# Patient Record
Sex: FEMALE | Race: WHITE | NOT HISPANIC OR LATINO | URBAN - METROPOLITAN AREA
[De-identification: names, ages, dates, MRNs, and addresses within clinical notes are randomized per-mention and may not be internally consistent; named-entity substitution may affect disease eponyms.]

---

## 2022-01-13 ENCOUNTER — HOSPITAL ENCOUNTER (EMERGENCY)
Facility: HOSPITAL | Age: 57
Discharge: HOME | End: 2022-01-13
Attending: EMERGENCY MEDICINE
Payer: COMMERCIAL

## 2022-01-13 VITALS
BODY MASS INDEX: 28.68 KG/M2 | WEIGHT: 168 LBS | RESPIRATION RATE: 20 BRPM | HEIGHT: 64 IN | HEART RATE: 92 BPM | TEMPERATURE: 98 F | SYSTOLIC BLOOD PRESSURE: 159 MMHG | DIASTOLIC BLOOD PRESSURE: 94 MMHG | OXYGEN SATURATION: 95 %

## 2022-01-13 DIAGNOSIS — I10 HYPERTENSION: ICD-10-CM

## 2022-01-13 DIAGNOSIS — G44.209 ACUTE NON INTRACTABLE TENSION-TYPE HEADACHE: Primary | ICD-10-CM

## 2022-01-13 LAB
ALBUMIN SERPL BCP-MCNC: 4.1 G/DL (ref 3.5–5.2)
ALP SERPL-CCNC: 76 U/L (ref 55–135)
ALT SERPL W/O P-5'-P-CCNC: 86 U/L (ref 10–44)
ANION GAP SERPL CALC-SCNC: 12 MMOL/L (ref 8–16)
AST SERPL-CCNC: 39 U/L (ref 10–40)
BASOPHILS # BLD AUTO: 0.04 K/UL (ref 0–0.2)
BASOPHILS NFR BLD: 0.6 % (ref 0–1.9)
BILIRUB SERPL-MCNC: 0.4 MG/DL (ref 0.1–1)
BILIRUB UR QL STRIP: NEGATIVE
BNP SERPL-MCNC: 10 PG/ML (ref 0–99)
BUN SERPL-MCNC: 12 MG/DL (ref 6–20)
CALCIUM SERPL-MCNC: 9.8 MG/DL (ref 8.7–10.5)
CHLORIDE SERPL-SCNC: 108 MMOL/L (ref 95–110)
CLARITY UR: CLEAR
CO2 SERPL-SCNC: 21 MMOL/L (ref 23–29)
COLOR UR: YELLOW
CREAT SERPL-MCNC: 0.8 MG/DL (ref 0.5–1.4)
D DIMER PPP IA.FEU-MCNC: 0.26 MG/L FEU
DIFFERENTIAL METHOD: ABNORMAL
EOSINOPHIL # BLD AUTO: 0.2 K/UL (ref 0–0.5)
EOSINOPHIL NFR BLD: 2.8 % (ref 0–8)
ERYTHROCYTE [DISTWIDTH] IN BLOOD BY AUTOMATED COUNT: 12.6 % (ref 11.5–14.5)
EST. GFR  (AFRICAN AMERICAN): >60 ML/MIN/1.73 M^2
EST. GFR  (NON AFRICAN AMERICAN): >60 ML/MIN/1.73 M^2
GLUCOSE SERPL-MCNC: 115 MG/DL (ref 70–110)
GLUCOSE UR QL STRIP: NEGATIVE
HCT VFR BLD AUTO: 44.8 % (ref 37–48.5)
HGB BLD-MCNC: 15.3 G/DL (ref 12–16)
HGB UR QL STRIP: NEGATIVE
IMM GRANULOCYTES # BLD AUTO: 0.02 K/UL (ref 0–0.04)
IMM GRANULOCYTES NFR BLD AUTO: 0.3 % (ref 0–0.5)
KETONES UR QL STRIP: NEGATIVE
LEUKOCYTE ESTERASE UR QL STRIP: NEGATIVE
LYMPHOCYTES # BLD AUTO: 1.7 K/UL (ref 1–4.8)
LYMPHOCYTES NFR BLD: 24.5 % (ref 18–48)
MCH RBC QN AUTO: 30.3 PG (ref 27–31)
MCHC RBC AUTO-ENTMCNC: 34.2 G/DL (ref 32–36)
MCV RBC AUTO: 89 FL (ref 82–98)
MONOCYTES # BLD AUTO: 0.8 K/UL (ref 0.3–1)
MONOCYTES NFR BLD: 11.3 % (ref 4–15)
NEUTROPHILS # BLD AUTO: 4.3 K/UL (ref 1.8–7.7)
NEUTROPHILS NFR BLD: 60.5 % (ref 38–73)
NITRITE UR QL STRIP: NEGATIVE
NRBC BLD-RTO: 0 /100 WBC
PH UR STRIP: 6 [PH] (ref 5–8)
PLATELET # BLD AUTO: 293 K/UL (ref 150–450)
PMV BLD AUTO: 9.1 FL (ref 9.2–12.9)
POTASSIUM SERPL-SCNC: 3.5 MMOL/L (ref 3.5–5.1)
PROT SERPL-MCNC: 7.6 G/DL (ref 6–8.4)
PROT UR QL STRIP: NEGATIVE
RBC # BLD AUTO: 5.05 M/UL (ref 4–5.4)
SARS-COV-2 RDRP RESP QL NAA+PROBE: NEGATIVE
SODIUM SERPL-SCNC: 141 MMOL/L (ref 136–145)
SP GR UR STRIP: 1.02 (ref 1–1.03)
TROPONIN I SERPL DL<=0.01 NG/ML-MCNC: <0.006 NG/ML (ref 0–0.03)
TSH SERPL DL<=0.005 MIU/L-ACNC: 1.54 UIU/ML (ref 0.4–4)
URN SPEC COLLECT METH UR: NORMAL
UROBILINOGEN UR STRIP-ACNC: NEGATIVE EU/DL
WBC # BLD AUTO: 7.11 K/UL (ref 3.9–12.7)

## 2022-01-13 PROCEDURE — 93010 EKG 12-LEAD: ICD-10-PCS | Mod: ,,, | Performed by: INTERNAL MEDICINE

## 2022-01-13 PROCEDURE — 81003 URINALYSIS AUTO W/O SCOPE: CPT | Performed by: EMERGENCY MEDICINE

## 2022-01-13 PROCEDURE — 84443 ASSAY THYROID STIM HORMONE: CPT | Performed by: EMERGENCY MEDICINE

## 2022-01-13 PROCEDURE — 25000003 PHARM REV CODE 250: Performed by: EMERGENCY MEDICINE

## 2022-01-13 PROCEDURE — 85025 COMPLETE CBC W/AUTO DIFF WBC: CPT | Performed by: EMERGENCY MEDICINE

## 2022-01-13 PROCEDURE — 96374 THER/PROPH/DIAG INJ IV PUSH: CPT

## 2022-01-13 PROCEDURE — U0002 COVID-19 LAB TEST NON-CDC: HCPCS | Performed by: EMERGENCY MEDICINE

## 2022-01-13 PROCEDURE — 99285 EMERGENCY DEPT VISIT HI MDM: CPT | Mod: 25

## 2022-01-13 PROCEDURE — 36415 COLL VENOUS BLD VENIPUNCTURE: CPT | Performed by: EMERGENCY MEDICINE

## 2022-01-13 PROCEDURE — 93010 ELECTROCARDIOGRAM REPORT: CPT | Mod: ,,, | Performed by: INTERNAL MEDICINE

## 2022-01-13 PROCEDURE — 85379 FIBRIN DEGRADATION QUANT: CPT | Performed by: EMERGENCY MEDICINE

## 2022-01-13 PROCEDURE — 93005 ELECTROCARDIOGRAM TRACING: CPT

## 2022-01-13 PROCEDURE — 83880 ASSAY OF NATRIURETIC PEPTIDE: CPT | Performed by: EMERGENCY MEDICINE

## 2022-01-13 PROCEDURE — 63600175 PHARM REV CODE 636 W HCPCS: Performed by: EMERGENCY MEDICINE

## 2022-01-13 PROCEDURE — 80053 COMPREHEN METABOLIC PANEL: CPT | Performed by: EMERGENCY MEDICINE

## 2022-01-13 PROCEDURE — 84484 ASSAY OF TROPONIN QUANT: CPT | Performed by: EMERGENCY MEDICINE

## 2022-01-13 RX ORDER — KETOROLAC TROMETHAMINE 30 MG/ML
10 INJECTION, SOLUTION INTRAMUSCULAR; INTRAVENOUS
Status: COMPLETED | OUTPATIENT
Start: 2022-01-13 | End: 2022-01-13

## 2022-01-13 RX ORDER — CLONIDINE HYDROCHLORIDE 0.1 MG/1
0.1 TABLET ORAL EVERY 6 HOURS PRN
Qty: 12 TABLET | Refills: 0 | Status: SHIPPED | OUTPATIENT
Start: 2022-01-13 | End: 2023-01-13

## 2022-01-13 RX ORDER — CLONIDINE HYDROCHLORIDE 0.1 MG/1
0.1 TABLET ORAL
Status: COMPLETED | OUTPATIENT
Start: 2022-01-13 | End: 2022-01-13

## 2022-01-13 RX ORDER — KETOROLAC TROMETHAMINE 10 MG/1
10 TABLET, FILM COATED ORAL EVERY 8 HOURS PRN
Qty: 6 TABLET | Refills: 0 | Status: SHIPPED | OUTPATIENT
Start: 2022-01-13 | End: 2022-01-15

## 2022-01-13 RX ORDER — ESOMEPRAZOLE MAGNESIUM 20 MG/1
20 GRANULE, DELAYED RELEASE ORAL
COMMUNITY

## 2022-01-13 RX ORDER — KETOROLAC TROMETHAMINE 30 MG/ML
INJECTION, SOLUTION INTRAMUSCULAR; INTRAVENOUS
Status: COMPLETED
Start: 2022-01-13 | End: 2022-01-13

## 2022-01-13 RX ADMIN — KETOROLAC TROMETHAMINE 10 MG: 30 INJECTION, SOLUTION INTRAMUSCULAR; INTRAVENOUS at 03:01

## 2022-01-13 RX ADMIN — CLONIDINE HYDROCHLORIDE 0.1 MG: 0.1 TABLET ORAL at 02:01

## 2022-01-13 NOTE — ED PROVIDER NOTES
"Encounter Date: 1/13/2022    SCRIBE #1 NOTE: I, Jerichobenito Ramirez, am scribing for, and in the presence of, Sanjeev Cruz MD.       History     Chief Complaint   Patient presents with    Headache / Hypertension     States she has had elevated blood pressure and a headache since Tuesday that is not responsive to tylenol.       Time seen by provider: 1:54 PM on 01/13/2022    Claire Guardado is a 56 y.o. female who presents to the ED with an onset of a HA and high blood pressure for the past 4 days. Patient's brother states patient had a blood pressure reading of 240/140 at home today. Patient reportedly has a family hx of strokes. She notes feeling "weird" and states her skin feels "tingly." Patient mentions receiving the Covid-19 booster 4 days ago. Patient states she has no hx of HTN or thyroid problems. She denies taking any daily medication besides Nexium. The patient denies any CP, abdominal pain, or any other symptoms at this time. PMHx of GERD. No pertinent PSHx. No record of patient being a smoker.       The history is provided by the patient and a relative (brother).     Review of patient's allergies indicates:   Allergen Reactions    Augmentin [amoxicillin-pot clavulanate]      Past Medical History:   Diagnosis Date    GERD (gastroesophageal reflux disease)      Past Surgical History:   Procedure Laterality Date    HYSTERECTOMY       Family History   Problem Relation Age of Onset    Hypertension Mother     Stroke Mother     Arthritis Mother     Hypertension Brother      Social History     Substance Use Topics    Alcohol use: Never    Drug use: Never     Review of Systems   Constitutional: Negative for appetite change, chills and fever.   HENT: Negative for congestion and nosebleeds.    Eyes: Negative for visual disturbance.   Respiratory: Negative for cough and shortness of breath.    Cardiovascular: Negative for chest pain and palpitations.   Gastrointestinal: Negative for abdominal pain, " diarrhea, nausea and vomiting.   Genitourinary: Negative for dysuria and hematuria.   Musculoskeletal: Negative for back pain and neck pain.   Skin: Negative for rash.   Neurological: Positive for weakness, numbness and headaches. Negative for seizures and syncope.       Physical Exam     Initial Vitals [01/13/22 1332]   BP Pulse Resp Temp SpO2   (!) 185/97 (!) 117 20 97.7 °F (36.5 °C) 99 %      MAP       --         Physical Exam    Nursing note and vitals reviewed.  Constitutional: She appears well-nourished.   Patient is flushed.    HENT:   Head: Normocephalic and atraumatic.   Eyes: Conjunctivae and EOM are normal.   Neck: Neck supple. No thyroid mass present.   Normal range of motion.  Cardiovascular: Regular rhythm and normal heart sounds. Tachycardia present.  Exam reveals no gallop and no friction rub.    No murmur heard.  Pulmonary/Chest: Breath sounds normal. She has no wheezes. She has no rhonchi. She has no rales.   Musculoskeletal:      Cervical back: Normal range of motion and neck supple.     Neurological: She is alert and oriented to person, place, and time. She has normal strength. No cranial nerve deficit or sensory deficit.   Skin: Skin is warm and dry. No rash noted. No erythema.   Psychiatric: She has a normal mood and affect. Her speech is normal. Cognition and memory are normal.         ED Course   Procedures  Labs Reviewed   CBC W/ AUTO DIFFERENTIAL - Abnormal; Notable for the following components:       Result Value    MPV 9.1 (*)     All other components within normal limits   COMPREHENSIVE METABOLIC PANEL - Abnormal; Notable for the following components:    CO2 21 (*)     Glucose 115 (*)     ALT 86 (*)     All other components within normal limits   B-TYPE NATRIURETIC PEPTIDE   D DIMER, QUANTITATIVE   TROPONIN I   TSH   URINALYSIS, REFLEX TO URINE CULTURE    Narrative:     Specimen Source->Urine   SARS-COV-2 RNA AMPLIFICATION, QUAL     EKG Readings: (Independently Interpreted)   Initial  Reading: No STEMI. Rhythm: Sinus Tachycardia. Heart Rate: 127. Axis: Normal.   Normal intervals. Possible inferior infarct, age undetermined.      ECG Results          EKG 12-lead (In process)  Result time 01/13/22 15:01:22    In process by Interface, Lab In [facility] (01/13/22 15:01:22)                 Narrative:    Test Reason : I10,    Vent. Rate : 127 BPM     Atrial Rate : 127 BPM     P-R Int : 142 ms          QRS Dur : 078 ms      QT Int : 316 ms       P-R-T Axes : 057 -13 040 degrees     QTc Int : 459 ms    Sinus tachycardia  Possible Anterior infarct ,age undetermined  Abnormal ECG  No previous ECGs available    Referred By:  ED MD           Confirmed By:                             Imaging Results          X-Ray Chest AP Portable (Final result)  Result time 01/13/22 16:03:27    Final result by Carola Brumfield MD (01/13/22 16:03:27)                 Impression:      No acute abnormality.      Electronically signed by: Carola Brumfield MD  Date:    01/13/2022  Time:    16:03             Narrative:    EXAMINATION:  XR CHEST AP PORTABLE    CLINICAL HISTORY:  Essential (primary) hypertension    TECHNIQUE:  Single frontal view of the chest was performed.    COMPARISON:  None    FINDINGS:  The lungs are clear, with normal appearance of pulmonary vasculature and no pleural effusion or pneumothorax.    The cardiac silhouette is normal in size. The hilar and mediastinal contours are unremarkable.    Bones are intact.                               CT Head Without Contrast (Final result)  Result time 01/13/22 14:35:03    Final result by Pj Abdullahi MD (01/13/22 14:35:03)                 Impression:      1.  There is no acute intracranial abnormality.  There is no hemorrhage, mass/mass effect, acute edema or ischemia.    2.  Left maxillary sinusitis.      Electronically signed by: Pj Abdullahi MD  Date:    01/13/2022  Time:    14:35             Narrative:    EXAMINATION:  CT HEAD WITHOUT CONTRAST    CLINICAL  HISTORY:  Headache, new or worsening (Age >= 50y);    TECHNIQUE:  Routine unenhanced axial images were obtained through the head.  Sagittal and coronal reformatted images were created.  The study is reviewed in bone and soft tissue windows.    COMPARISON:  None    FINDINGS:  Intracranial contents: There is no acute intracranial abnormality.  Brain volume, ventricular size and position are normal.  There is no hemorrhage or mass/mass effect.  There is no acute edema or ischemia.  The gray-white interface is preserved without obvious acute infarction.  There are no definite regions of abnormal attenuation in the brain.  The vessels are all slightly dense which can be seen in the setting of dehydration, hemoconcentration.  There is no abnormal extra-axial fluid collection.  The basilar cisterns are open.  The cerebellar tonsils are in normal position at the level of the foramen magnum.    Extracranial contents, calvarium, soft tissues: The calvarium is normal.  There is mucosal thickening with fluid in the left maxillary sinus.  There is trace mucosal thickening in the posterior ethmoid air cells.  Otherwise, the included paranasal sinuses and mastoid air cells are clear.                                 Medications   cloNIDine tablet 0.1 mg (0.1 mg Oral Given 1/13/22 1415)   ketorolac injection 9.999 mg (9.999 mg Intravenous Given 1/13/22 1543)   ketorolac (TORADOL) 30 mg/mL (1 mL) injection (  Return to Cabinet 1/13/22 1545)     Medical Decision Making:   History:   Old Medical Records: I decided to obtain old medical records.  Clinical Tests:   Lab Tests: Reviewed and Ordered  Radiological Study: Ordered and Reviewed  Medical Tests: Ordered and Reviewed  ED Management:  This patient was emergently assessed shortly after arrival.  Initial vital signs are significant for hypertension but the patient is alert and neuro intact.  There is no meningismus.  She is nontoxic in appearance.  She is provided clonidine gradual  marked improvement in blood pressure.  She later was provided Toradol which induce further resolution of her headache.  Large workup including EKG, CBC, CMP, thyroid studies, urinalysis, BNP, D-dimer, chest x-ray, troponin were all found to be within normal limits for the patient.  She is COVID negative.  On final reassessment she is educated the cause of her acute onset hypertension and headache are not overly known but may be secondary to a side effect of the recent immunization.  She is educated that I have very low suspicion for occult additional contributors including pheochromocytoma, CNS infection, additional metabolic crisis.  She will be discharged with a prescription for clonidine to take should her blood pressure /100, as well as a short course of Toradol for additional breakthrough pain but she is asked to follow up with her primary care doctor as soon as possible regarding further blood pressure monitoring and treatment.  She was asked to return to the emergency department immediately for any new, concerning, or worsening symptoms including return of headache or any neurologic symptoms.  Patient was agreeable with this plan for follow-up and was discharged stable condition with a .          Scribe Attestation:   Scribe #1: I performed the above scribed service and the documentation accurately describes the services I performed. I attest to the accuracy of the note.              I, Dr. Sanjeev Cruz, personally performed the services described in this documentation. All medical record entries made by the scribe were at my direction and in my presence.  I have reviewed the chart and agree that the record reflects my personal performance and is accurate and complete. Sanjeev Cruz MD.  8:15 AM 01/14/2022      Clinical Impression:   Final diagnoses:  [I10] Hypertension  [G44.209] Acute non intractable tension-type headache (Primary)          ED Disposition Condition    Discharge Stable        ED  Prescriptions     Medication Sig Dispense Start Date End Date Auth. Provider    cloNIDine (CATAPRES) 0.1 MG tablet Take 1 tablet (0.1 mg total) by mouth every 6 (six) hours as needed (BP > 160/100 mm Hg). 12 tablet 1/13/2022 1/13/2023 Sanjeev Cruz MD    ketorolac (TORADOL) 10 mg tablet Take 1 tablet (10 mg total) by mouth every 8 (eight) hours as needed for Pain. 6 tablet 1/13/2022 1/15/2022 Sanjeev Cruz MD        Follow-up Information     Follow up With Specialties Details Why Contact Info    [facility] Emergency Dept Emergency Medicine  If symptoms worsen [address] [place] [id number] [phone]           Sanjeev Cruz MD  01/14/22 0833

## 2024-09-10 ENCOUNTER — TELEPHONE (OUTPATIENT)
Facility: CLINIC | Age: 59
End: 2024-09-10
Payer: COMMERCIAL

## 2024-09-10 DIAGNOSIS — R89.7 ABNORMAL BREAST BIOPSY: Primary | ICD-10-CM

## 2024-09-10 NOTE — TELEPHONE ENCOUNTER
Per Dr. Kapoor, I reached out to pt to schedule an appt with Comprehensive breast clinic. Pt had breast biopsy at [facility] and it returned positive. Pt scheduled for 9/16 at 3:00. Pt verbalized understanding.

## 2024-09-16 ENCOUNTER — OFFICE VISIT (OUTPATIENT)
Facility: CLINIC | Age: 59
End: 2024-09-16
Payer: COMMERCIAL

## 2024-09-16 VITALS
HEART RATE: 111 BPM | TEMPERATURE: 99 F | SYSTOLIC BLOOD PRESSURE: 137 MMHG | TEMPERATURE: 99 F | HEART RATE: 111 BPM | BODY MASS INDEX: 28.49 KG/M2 | DIASTOLIC BLOOD PRESSURE: 80 MMHG | SYSTOLIC BLOOD PRESSURE: 137 MMHG | BODY MASS INDEX: 28.49 KG/M2 | WEIGHT: 166 LBS | WEIGHT: 166 LBS | DIASTOLIC BLOOD PRESSURE: 80 MMHG

## 2024-09-16 DIAGNOSIS — R89.7 ABNORMAL BREAST BIOPSY: ICD-10-CM

## 2024-09-16 DIAGNOSIS — C50.911 INVASIVE DUCTAL CARCINOMA OF BREAST, FEMALE, RIGHT: Primary | ICD-10-CM

## 2024-09-16 PROCEDURE — 3075F SYST BP GE 130 - 139MM HG: CPT | Mod: CPTII,S$GLB,, | Performed by: RADIOLOGY

## 2024-09-16 PROCEDURE — 1159F MED LIST DOCD IN RCRD: CPT | Mod: CPTII,S$GLB,, | Performed by: RADIOLOGY

## 2024-09-16 PROCEDURE — G2211 COMPLEX E/M VISIT ADD ON: HCPCS | Mod: S$GLB,,, | Performed by: RADIOLOGY

## 2024-09-16 PROCEDURE — 4010F ACE/ARB THERAPY RXD/TAKEN: CPT | Mod: CPTII,S$GLB,, | Performed by: RADIOLOGY

## 2024-09-16 PROCEDURE — 99999 PR PBB SHADOW E&M-EST. PATIENT-LVL III: CPT | Mod: PBBFAC,,, | Performed by: INTERNAL MEDICINE

## 2024-09-16 PROCEDURE — 1160F RVW MEDS BY RX/DR IN RCRD: CPT | Mod: CPTII,S$GLB,, | Performed by: RADIOLOGY

## 2024-09-16 PROCEDURE — 99999 PR PBB SHADOW E&M-EST. PATIENT-LVL III: CPT | Mod: PBBFAC,,,

## 2024-09-16 PROCEDURE — 3008F BODY MASS INDEX DOCD: CPT | Mod: CPTII,S$GLB,, | Performed by: RADIOLOGY

## 2024-09-16 PROCEDURE — 3079F DIAST BP 80-89 MM HG: CPT | Mod: CPTII,S$GLB,, | Performed by: RADIOLOGY

## 2024-09-16 PROCEDURE — 99205 OFFICE O/P NEW HI 60 MIN: CPT | Mod: S$GLB,,, | Performed by: RADIOLOGY

## 2024-09-16 RX ORDER — AMLODIPINE BESYLATE 5 MG/1
5 TABLET ORAL DAILY
COMMUNITY

## 2024-09-16 RX ORDER — LISINOPRIL 20 MG/1
20 TABLET ORAL DAILY
COMMUNITY

## 2024-09-16 NOTE — LETTER
September 16, 2024        Chace Fraga NP  [address] [facility]  [place] [address]             [facility]  [address] [address]  Phone: [phone]  Fax: [phone]   Patient: Claire Guarddao   MR Number: [id number]   YOB: 1965   Date of Visit: 9/16/2024       Dear Dr. Fraga:    Thank you for referring Claire Guardado to me for evaluation. Below are the relevant portions of my assessment and plan of care.            If you have questions, please do not hesitate to call me. I look forward to following Claire along with you.    Sincerely,      RAJ Escobar MD           CC    No Recipients

## 2024-09-16 NOTE — PROGRESS NOTES
Claire Guardado  [id number]  1965 9/16/2024  Michael Kapoor Jr., Md  [address] [facility]  [address] [place] [address]    REASON FOR CONSULTATION: IA, [id number] g1 IDC UOQ R breast, ER+/RI+/HER2(-)  TREATMENT GOAL: adjuvant    HISTORY OF PRESENT ILLNESS:   Claire Guardado is a 59 y.o. postmenopausal female with suspected Sjogren's syndrome (not on therapy) and (-) family history of breast cancer with abnormal screening mammogram in January of this year appreciating multiple nodules with follow-up ultrasound demonstrating simple and complicated cysts.  This was followed by short interval diagnostic mammogram and ultrasound which revealed a 5 x 7 mm irregular hypoechoic nodule at 10:00 5CFN of the right breast.  Core needle biopsy returned grade 1 (5/9) invasive ductal carcinoma with associated grade 2 DCIS.  There was no lymphovascular space invasion and tumor was ER + %, RI + 80-90%, HER2(-).     Patient was seen in Multidisciplinary Comprehensive breast Clinic at [facility] by Dr. Escobar (Medical Oncology), and myself (Radiation Oncology) to formulate treatment plan.  She had a surgical consultation with Dr. Lyman and is tentatively planned for lumpectomy and sentinel lymph node biopsy 9/25/24.    Denies fever, chills, chest pain or shortness of breath.  Denies pain or discomfort within the right breast.  Reports a pruritic rash of the right lower quadrant abdomen since biopsy.    Review of systems otherwise negative unless indicated in HPI.    Past Medical History:   Diagnosis Date    GERD (gastroesophageal reflux disease)      Past Surgical History:   Procedure Laterality Date    HYSTERECTOMY       Social History     Socioeconomic History    Marital status:    Substance and Sexual Activity    Alcohol use: Never    Drug use: Never     Social Determinants of Health     Financial Resource Strain: Low Risk  (9/10/2024)    Overall Financial Resource Strain (CARDIA)     Difficulty of Paying Living  Expenses: Not hard at all   Food Insecurity: No Food Insecurity (9/10/2024)    Hunger Vital Sign     Worried About Running Out of Food in the Last Year: Never true     Ran Out of Food in the Last Year: Never true   Physical Activity: Sufficiently Active (9/10/2024)    Exercise Vital Sign     Days of Exercise per Week: 7 days     Minutes of Exercise per Session: 30 min   Stress: No Stress Concern Present (9/10/2024)    [employer] - Occupational Stress Questionnaire     Feeling of Stress : Not at all   Housing Stability: Unknown (9/10/2024)    Housing Stability Vital Sign     Unable to Pay for Housing in the Last Year: No     Family History   Problem Relation Name Age of Onset    Hypertension Mother      Stroke Mother      Arthritis Mother      Hypertension Brother         PRIOR HISTORY OF CHEMOTHERAPY OR RADIOTHERAPY: Please see HPI for patients prior oncologic history.    Medication List with Changes/Refills   Current Medications    AMLODIPINE (NORVASC) 5 MG TABLET    Take 5 mg by mouth once daily.    ASPIRIN (ASPIR-81 ORAL)    Take 81 mg by mouth once daily.    ESOMEPRAZOLE (NEXIUM) 20 MG GRPS    Take 20 mg by mouth before breakfast.    LISINOPRIL (PRINIVIL,ZESTRIL) 20 MG TABLET    Take 20 mg by mouth once daily.     Review of patient's allergies indicates:   Allergen Reactions    Augmentin [amoxicillin-pot clavulanate]        QUALITY OF LIFE: 100%- Normal, No Complaints, No Evidence of Disease    Vitals:    09/16/24 1551   BP: 137/80   Pulse: (!) 111   Temp: 98.7 °F (37.1 °C)   Weight: 75.3 kg (166 lb)   PainSc: 0-No pain     Body mass index is 28.49 kg/m².    PHYSICAL EXAM:   GENERAL: alert; in no apparent distress.   HEAD: normocephalic, atraumatic.  EYES: pupils are equal, round, reactive to light and accommodation. Sclera anicteric. Conjunctiva not injected.   NOSE/THROAT: no nasal erythema or rhinorrhea. Oropharynx pink, without erythema, ulcerations or thrush.   NECK: no  cervical motion rigidity; supple with no masses.    CHEST: Patient is speaking comfortably on room air with normal work of breathing without using accessory muscles of respiration.  CARDIOVASCULAR: regular rate and rhythm  ABDOMEN: soft, nontender, nondistended.   MUSCULOSKELETAL: no tenderness to palpation along the spine or scapulae. Normal range of motion.  NEUROLOGIC: cranial nerves II-XII intact bilaterally. Strength 5/5 in bilateral upper and lower extremities. No sensory deficits appreciated. Normal gait.  EXTREMITIES: no clubbing, cyanosis, edema.  SKIN:  Faint eczematous dry rash at right lower quadrant without ulceration, cellulitis or fluctuance    REVIEW OF IMAGING/PATHOLOGY/LABS: Please see HPI. All images reviewed personally by dictating physician.     ASSESSMENT: Claire Guardado is a 59 y.o. female with stage IA, [id number] g1 IDC UOQ R breast, ER+/NE+/HER2(-)  PLAN:  Claire Guardado presents with negative family history of breast cancer and screen detected, biopsy-proven grade 1 invasive ductal carcinoma of the right breast, strongly ER/NE positive, HER2 negative.  Consensus recommendation is to proceed with upfront surgery and she has met with Dr. Lyman with lumpectomy and sentinel lymph node biopsy planned. She met with Dr. Escobar today who discussed Oncotype DX testing, indications for chemotherapy and antiestrogen treatment.     Today I described adjuvant radiotherapy following lumpectomy to eliminate residual disease within the breast thereby providing equivalent oncologic results to mastectomy using a breast conserving protocol.  I described the 3D conformal tangential beam approach that allows for sparing of the underlying lung and heart.      She appears to be a good candidate for hypo fractionated radiotherapy and I anticipate 3-4 weeks of treatment pending final pathology.  Lastly we discussed indications for comprehensive jennifer therapy pending results of sentinel lymph node  biopsy which would require standard fractionated radiotherapy.  If indicated per Oncotype DX testing, chemotherapy would precede radiotherapy; antiestrogen therapy would follow radiotherapy    I carefully explained the process of simulation and treatment delivery with weekly physician visits. Patient wishes to proceed.    Consent deferred.    Advised topical steroid to pruritic right abdominal rash.     We discussed the risks and benefits of the above treatment and have gone over in detail the acute and late toxicities of radiation therapy to the R breast.     The patient has our contact information and understands that they are free to contact us at any time with questions or concerns regarding radiation therapy.     DISPOSITION: RTC FOR CT SIM 4wks post-op     I have personally seen and evaluated this patient with a high complexity diagnosis.      60 minutes were dedicated to reviewing/interpreting pertinent laboratory/imaging/pathology as well as prior consultations; reviewing and performing history and physical; counseling patient on oncologic recommendations; documentation in the electronic medical record including ordering of additional tests and/or radiation treatment protocol; and coordination of care with physicians with referrals placed as appropriate.    PHYSICIAN: Michael Kapoor Jr, MD    Thank you for the opportunity to meet and consult with Claire Guardado.   Please feel free to contact me to discuss the above recommendation further.

## 2024-09-17 NOTE — PROGRESS NOTES
[facility] [place] Initial Encounter Note    24    Subjective:      Patient ID:   Claire Guardado  59 y.o. female  1965  FACUNDO Mohr MD      Chief Complaint:   L breast cancer    HPI:  59 y.o. female had an abnormal screening mammogram, with a 7 mm nodule area at the 10 o'clock position of the Right breast.  This had been seen initially in   And subsequently confirmed in .  Biopsy returned invasive ductal carcinoma, grade 1, ERP was positive, PRP was positive, HER2 Morro was 1+ negative.  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  The primary tumor per the biopsy was at least 7 mm.  Case ID number is S  from 2024.  At [facility].    She has history of COVID infection in 2019.  She did take the COVID vaccinations and subsequently developed headache, hypertension, and short-term vertigo.    She admits now to having hypertension and GERD.  She does have history of joint symptoms and a positive SHASTA.  She has been seen per a rheumatologist and told that she might have Sjogren's syndrome.    Medications include lisinopril 20 mg daily, amlodipine 5 mg daily, Nexium 20 mg 1 p.o. daily, aspirin 81 mg p.o. daily.  She took birth control pills for approximately 18 years and did not take hormone replacement therapy.    She is listed as having allergy to Augmentin as manifested as feeling flushed.    She does not smoke, she does not drink alcohol, and she is a registered nurse retired.    She is status post partial hysterectomy in .  She had onset of menses at age 12, she delivered her 1st and only child at the maternal age of 30.  She is a  1 para 1 miscarriage 0 individual.  She has not had history of breast biopsy prior to the current situation.  Her mother, sister, daughter has not had breast cancer.    Her mother has Sjogren's syndrome, osteoarthritis, hypertension, and CVA x2.   Her father had Alzheimer's disease, prostate cancer, he was a smoker.  Her brother is a smoker and has COPD, another brother is alive and well, a sister has history of kidney cancer and her daughter has history of thyroid disease, a paternal grandmother had uterine cancer.      ROS:   GEN: normal without any fever, night sweats or weight loss  HEENT: normal with no HA's, sore throat, stiff neck, changes in vision  CV: normal with no CP, SOB, PND, SEPULVEDA or orthopnea  PULM: normal with no SOB, cough, hemoptysis, sputum or pleuritic pain  GI: normal with no abdominal pain, nausea, vomiting, constipation, diarrhea, melanotic stools, BRBPR, or hematemesis  : normal with no hematuria, dysuria  BREAST: See HPI  SKIN:  Since the time of her breast biopsy.  She admits to having a irregular rash noted at the skin of the right pelvic area, pruritic.     Past Medical History:   Diagnosis Date    GERD (gastroesophageal reflux disease)      Past Surgical History:   Procedure Laterality Date    HYSTERECTOMY         Review of patient's allergies indicates:   Allergen Reactions    Augmentin [amoxicillin-pot clavulanate]      Social History     Socioeconomic History    Marital status:    Substance and Sexual Activity    Alcohol use: Never    Drug use: Never     Social Determinants of Health     Financial Resource Strain: Low Risk  (9/10/2024)    Overall Financial Resource Strain (CARDIA)     Difficulty of Paying Living Expenses: Not hard at all   Food Insecurity: No Food Insecurity (9/10/2024)    Hunger Vital Sign     Worried About Running Out of Food in the Last Year: Never true     Ran Out of Food in the Last Year: Never true   Physical Activity: Sufficiently Active (9/10/2024)    Exercise Vital Sign     Days of Exercise per Week: 7 days     Minutes of Exercise per Session: 30 min   Stress: No Stress Concern Present (9/10/2024)    [employer] - Occupational Stress Questionnaire     Feeling of  Stress : Not at all   Housing Stability: Unknown (9/10/2024)    Housing Stability Vital Sign     Unable to Pay for Housing in the Last Year: No         Current Outpatient Medications:     amLODIPine (NORVASC) 5 MG tablet, Take 5 mg by mouth once daily., Disp: , Rfl:     aspirin (ASPIR-81 ORAL), Take 81 mg by mouth once daily., Disp: , Rfl:     esomeprazole (NEXIUM) 20 mg GrPS, Take 20 mg by mouth before breakfast., Disp: , Rfl:     lisinopriL (PRINIVIL,ZESTRIL) 20 MG tablet, Take 20 mg by mouth once daily., Disp: , Rfl:           Objective:   Vitals:  Blood pressure 137/80, pulse (!) 111, temperature 98.7 °F (37.1 °C), weight 75.3 kg (166 lb).    Physical Examination:   GEN: no apparent distress, comfortable  HEAD: atraumatic and normocephalic  EYES: no pallor, no icterus  ENT:  no pharyngeal erythema, external ears WNL; no nasal discharge  NECK: no masses, thyroid normal, trachea midline, no LAD/LN's, supple  CV: RRR with no murmur; normal pulse  CHEST: Normal respiratory effort; CTAB; normal breath sounds; no wheeze or crackles  ABDOM: nontender and nondistended; soft, L/S NP, no rebound/guarding  MUSC/Skeletal: ROM normal; no crepitus; joints normal  EXTREM: no clubbing, cyanosis, inflammation or swelling, calves are nontender at the left and right leg.  SKIN:  At the skin overlying the right pelvic area she has an erythematous irregular rash noted, not raised, not warm, and nontender.  : no CVAT  NEURO: grossly intact; motor/sensory WNL;  no tremors  PSYCH: normal mood, affect and behavior  LYMPH: normal cervical, supraclavicular, axillary LN's  BREASTS:  The left and right breast are nontender, without discharge or ecchymoses, and without palpable mass.  She does not have palpable lymphadenopathy at the left or right axilla, supraclavicular or cervical areas.      Labs:   Lab Results   Component Value Date    WBC 7.11 01/13/2022    HGB 15.3 01/13/2022    HCT 44.8 01/13/2022    MCV 89 01/13/2022      01/13/2022    CMP  Sodium   Date Value Ref Range Status   01/13/2022 141 136 - 145 mmol/L Final     Potassium   Date Value Ref Range Status   01/13/2022 3.5 3.5 - 5.1 mmol/L Final     Chloride   Date Value Ref Range Status   01/13/2022 108 95 - 110 mmol/L Final     CO2   Date Value Ref Range Status   01/13/2022 21 (L) 23 - 29 mmol/L Final     Glucose   Date Value Ref Range Status   01/13/2022 115 (H) 70 - 110 mg/dL Final     BUN   Date Value Ref Range Status   01/13/2022 12 6 - 20 mg/dL Final     Creatinine   Date Value Ref Range Status   01/13/2022 0.8 0.5 - 1.4 mg/dL Final     Calcium   Date Value Ref Range Status   01/13/2022 9.8 8.7 - 10.5 mg/dL Final     Total Protein   Date Value Ref Range Status   01/13/2022 7.6 6.0 - 8.4 g/dL Final     Albumin   Date Value Ref Range Status   01/13/2022 4.1 3.5 - 5.2 g/dL Final     Total Bilirubin   Date Value Ref Range Status   01/13/2022 0.4 0.1 - 1.0 mg/dL Final     Comment:     For infants and newborns, interpretation of results should be based  on gestational age, weight and in agreement with clinical  observations.    Premature Infant recommended reference ranges:  Up to 24 hours.............<8.0 mg/dL  Up to 48 hours............<12.0 mg/dL  3-5 days..................<15.0 mg/dL  6-29 days.................<15.0 mg/dL       Alkaline Phosphatase   Date Value Ref Range Status   01/13/2022 76 55 - 135 U/L Final     AST   Date Value Ref Range Status   01/13/2022 39 10 - 40 U/L Final     ALT   Date Value Ref Range Status   01/13/2022 86 (H) 10 - 44 U/L Final     Anion Gap   Date Value Ref Range Status   01/13/2022 12 8 - 16 mmol/L Final     eGFR if    Date Value Ref Range Status   01/13/2022 >60 >60 mL/min/1.73 m^2 Final     eGFR if non    Date Value Ref Range Status   01/13/2022 >60 >60 mL/min/1.73 m^2 Final     Comment:     Calculation used to obtain the estimated glomerular filtration  rate (eGFR) is the CKD-EPI equation.             Assessment:   (1) 59 y.o. female has clinical stage I A invasive ductal carcinoma at the 10 o'clock position of the right breast.  Ductal carcinoma is also seen.  The grade of the primary tumor is grade 1.  ERP is strongly positive, PRP is strongly positive, HER2 Morro is 1+ negative.  The pathology report from [facility] is dated September 9, 2024, case ID S .    (2) with a small tumor of this size and with nonpalpable lymphadenopathy at the right axilla, she could proceed with partial mastectomy and sentinel node biopsy followed by adjuvant radiation therapy.  This would be my recommendation here.  She has seen Dr. Kapoor and has previously met with Dr. Stone.  She is to have the right lumpectomy and sentinel node biopsy procedure on September 25, 2024.  [facility].    Another option would be to do a right mastectomy and sentinel node biopsy without the addition of adjuvant radiation therapy here.    Another option would be to do the right mastectomy and sentinel node biopsy and to proceed with a prophylactic left mastectomy here.    (3) once we know from the pathology report from September 25, 2024 what the size of the primary tumor is, and whether or not the lymph nodes are positive or negative for metastatic disease, then we will request Oncotype DX testing with a genetic analysis of the primary tumor.  Oncotype DX testing here would give us an estimate of the potential benefit in reducing systemic recurrence risk when taking adjuvant hormonal, endocrine, antiestrogen therapy including tamoxifen or Arimidex.  Also expected the Oncotype DX testing would give us an assessment of any potential benefit of adjuvant chemotherapy here in addition to the endocrine therapy mentioned above.    She is going to follow up with myself towards the end of October 20, 2024 in the clinic.  After her partial mastectomy and sentinel node biopsy, I anticipate that her adjuvant radiation therapy will  begin at 4-6 weeks postop.  Her surgeon of record is Dr. Elvin Bella, her radiation physician is Dr. Kapoor, and her primary care is Chace Fraga NP.    Rogelio Saldaña MD  [facility]  9/16/24

## 2024-09-24 ENCOUNTER — TELEPHONE (OUTPATIENT)
Dept: DERMATOLOGY | Facility: CLINIC | Age: 59
End: 2024-09-24
Payer: COMMERCIAL

## 2024-09-25 ENCOUNTER — TELEPHONE (OUTPATIENT)
Dept: DERMATOLOGY | Facility: CLINIC | Age: 59
End: 2024-09-25
Payer: COMMERCIAL

## 2024-09-30 ENCOUNTER — TELEPHONE (OUTPATIENT)
Dept: DERMATOLOGY | Facility: CLINIC | Age: 59
End: 2024-09-30
Payer: COMMERCIAL

## 2024-09-30 NOTE — TELEPHONE ENCOUNTER
----- Message from You sent at 9/30/2024  9:31 AM CDT -----  Type:  Same Day Appointment Request    Caller is requesting a same day appointment.  Caller declined first available appointment listed below.      Name of Caller:  pt  When is the first available appointment?  11/27--said she need to be seen today--rash spreading--please call and advise  Symptoms:  rash spreading  Best Call Back Number:  [phone]  Additional Information:   thank you

## 2024-10-10 ENCOUNTER — OFFICE VISIT (OUTPATIENT)
Dept: DERMATOLOGY | Facility: CLINIC | Age: 59
End: 2024-10-10
Payer: COMMERCIAL

## 2024-10-10 DIAGNOSIS — R21 RASH: Primary | ICD-10-CM

## 2024-10-10 DIAGNOSIS — L29.9 PRURITUS: ICD-10-CM

## 2024-10-10 PROCEDURE — 88305 TISSUE EXAM BY PATHOLOGIST: CPT | Performed by: DERMATOLOGY

## 2024-10-10 PROCEDURE — 1159F MED LIST DOCD IN RCRD: CPT | Mod: CPTII,S$GLB,, | Performed by: STUDENT IN AN ORGANIZED HEALTH CARE EDUCATION/TRAINING PROGRAM

## 2024-10-10 PROCEDURE — 99204 OFFICE O/P NEW MOD 45 MIN: CPT | Mod: 25,S$GLB,, | Performed by: STUDENT IN AN ORGANIZED HEALTH CARE EDUCATION/TRAINING PROGRAM

## 2024-10-10 PROCEDURE — 11104 PUNCH BX SKIN SINGLE LESION: CPT | Mod: S$GLB,,, | Performed by: STUDENT IN AN ORGANIZED HEALTH CARE EDUCATION/TRAINING PROGRAM

## 2024-10-10 PROCEDURE — 4010F ACE/ARB THERAPY RXD/TAKEN: CPT | Mod: CPTII,S$GLB,, | Performed by: STUDENT IN AN ORGANIZED HEALTH CARE EDUCATION/TRAINING PROGRAM

## 2024-10-10 PROCEDURE — 88305 TISSUE EXAM BY PATHOLOGIST: CPT | Mod: 26,,, | Performed by: DERMATOLOGY

## 2024-10-10 PROCEDURE — 1160F RVW MEDS BY RX/DR IN RCRD: CPT | Mod: CPTII,S$GLB,, | Performed by: STUDENT IN AN ORGANIZED HEALTH CARE EDUCATION/TRAINING PROGRAM

## 2024-10-10 RX ORDER — HYDROXYZINE HYDROCHLORIDE 25 MG/1
25 TABLET, FILM COATED ORAL NIGHTLY
Qty: 30 TABLET | Refills: 2 | Status: SHIPPED | OUTPATIENT
Start: 2024-10-10

## 2024-10-10 RX ORDER — DIPHENHYDRAMINE HCL 50 MG
50 CAPSULE ORAL NIGHTLY PRN
COMMUNITY

## 2024-10-10 RX ORDER — LORATADINE 10 MG/1
10 TABLET ORAL DAILY
COMMUNITY

## 2024-10-10 RX ORDER — PREDNISONE 10 MG/1
TABLET ORAL
Qty: 40 TABLET | Refills: 0 | Status: SHIPPED | OUTPATIENT
Start: 2024-10-10

## 2024-10-10 RX ORDER — CETIRIZINE HYDROCHLORIDE 5 MG/1
5 TABLET ORAL DAILY
Qty: 30 TABLET | Refills: 2 | Status: SHIPPED | OUTPATIENT
Start: 2024-10-10 | End: 2025-10-10

## 2024-10-10 RX ORDER — TRIAMCINOLONE ACETONIDE 1 MG/G
CREAM TOPICAL 2 TIMES DAILY
Qty: 454 G | Refills: 1 | Status: SHIPPED | OUTPATIENT
Start: 2024-10-10

## 2024-10-10 NOTE — PATIENT INSTRUCTIONS
Switch claritin to zyrtec  Switch benadryl to hydroxyzine nightly   Prednisone taper instructions in the morning   Triamcinolone cream twice daily - thick coat- cerave anti itch on top     Punch Biopsy Wound Care    Your doctor has performed a punch biopsy today.  A band aid and antibiotic ointment has been placed over the site.  This should remain in place for 24 hours.  It is recommended that you keep the area dry for the first 24 hours.  After 24 hours, you may remove the band aid and wash the area with warm soap and water and apply Vaseline jelly.  Many patients prefer to use Neosporin or Bacitracin ointment.  This is acceptable; however know that you can develop an allergy to this medication even if you have used it safely for years.  It is important to keep the area moist.  Letting it dry out and get air slows healing time, will worsen the scar, and make it more difficult to remove the stitches if they were placed.  Band aid is optional after first 24 hours.      If you notice increasing redness, tenderness, pain, or yellow drainage at the biopsy or surgical site, please notify your doctor.  These are signs of an infection.    If your biopsy/surgical site is bleeding, apply firm pressure for 15 minutes straight.  Repeat for another 15 minutes, if it is still bleeding.   If the surgical site continues to bleed, then please contact your doctor.      [address] [place] [address]/ [phone] FAX/ [email or web address]

## 2024-10-10 NOTE — PROGRESS NOTES
Subjective:      Patient ID:  Claire Guardado is a 59 y.o. female who presents for   Chief Complaint   Patient presents with    Rash     All over     New Patient    Patient is coming in today with complaints of a rash all over. States that on 09/04 patient had a right breast biopsy and stainless steel marker placed from where the biopsy was taken.  Biopsy came back showing invasive ductal carcinoma.   Underwent general anesthesia for breast biopsy, possibly given IV antibiotic   No recent illnesses but had a headache for a few days.   On 9/11 she developed a rash on her abdomen. Lesion is fixed and and spreading states that it is itchy, stings, and is dry.  9/16- she saw Dr. Escobar and Dr. Norton.   She was started claritin.   9/23- saw [facility] rheumatology [facility] [place] [facility] - has hx of sjogren's   9/24- Dr. Chace Fraga- referred here   9/25- had lumpectomy   9/30- Dr. Amezcua - GP-  has prednisone 15mg x 1, 10mg x 1, 5mg x 1- given by rheumatology for achiness- only uses 4 times a year- so Seven started 15mg x 2 days, 10 mg x 1 day, 5mg x 5 days- 9/30- 10/6 - did not help.   She then started triamcinolone cream, cerave cream, cerave soap, caladryl.     Patient also has diagnoses of Sjogren's     No new medications- on her current regimen for years  Denies taking any supplements.     Current Outpatient Medications:   ·  amLODIPine (NORVASC) 5 MG tablet, Take 5 mg by mouth once daily., Disp: , Rfl:   ·  aspirin (ASPIR-81 ORAL), Take 81 mg by mouth once daily., Disp: , Rfl:   ·  diphenhydrAMINE (BENADRYL) 50 MG capsule, Take 50 mg by mouth nightly as needed for Itching., Disp: , Rfl:   ·  esomeprazole (NEXIUM) 20 mg GrPS, Take 20 mg by mouth before breakfast., Disp: , Rfl:   ·  lisinopriL (PRINIVIL,ZESTRIL) 20 MG tablet, Take 20 mg by mouth once daily., Disp: , Rfl:   ·  loratadine (CLARITIN) 10 mg tablet, Take 10 mg by mouth once daily., Disp: , Rfl:         Review of Systems   Constitutional:  Negative for fever,  chills and fatigue.   Respiratory:  Negative for cough and shortness of breath.    Gastrointestinal:  Negative for nausea, vomiting and diarrhea.   Skin:  Positive for itching, rash and dry skin.       Objective:   Physical Exam   Constitutional: She appears well-developed and well-nourished.   Neurological: She is alert and oriented to person, place, and time.   Psychiatric: She has a normal mood and affect.   Skin:   Areas Examined (abnormalities noted in diagram):   Head / Face Inspection Performed  Neck Inspection Performed  Chest / Axilla Inspection Performed  Abdomen Inspection Performed  Back Inspection Performed  RUE Inspected  LUE Inspection Performed  RLE Inspected  LLE Inspection Performed            Diagram Legend     Erythematous scaling macule/papule c/w actinic keratosis       Vascular papule c/w angioma      Pigmented verrucoid papule/plaque c/w seborrheic keratosis      Yellow umbilicated papule c/w sebaceous hyperplasia      Irregularly shaped tan macule c/w lentigo     1-2 mm smooth white papules consistent with Milia      Movable subcutaneous cyst with punctum c/w epidermal inclusion cyst      Subcutaneous movable cyst c/w pilar cyst      Firm pink to brown papule c/w dermatofibroma      Pedunculated fleshy papule(s) c/w skin tag(s)      Evenly pigmented macule c/w junctional nevus     Mildly variegated pigmented, slightly irregular-bordered macule c/w mildly atypical nevus      Flesh colored to evenly pigmented papule c/w intradermal nevus       Pink pearly papule/plaque c/w basal cell carcinoma      Erythematous hyperkeratotic cursted plaque c/w SCC      Surgical scar with no sign of skin cancer recurrence      Open and closed comedones      Inflammatory papules and pustules      Verrucoid papule consistent consistent with wart     Erythematous eczematous patches and plaques     Dystrophic onycholytic nail with subungual debris c/w onychomycosis     Umbilicated papule    Erythematous-base  heme-crusted tan verrucoid plaque consistent with inflamed seborrheic keratosis     Erythematous Silvery Scaling Plaque c/w Psoriasis     See annotation      Assessment / Plan:      Pathology Orders:       Normal Orders This Visit    Specimen to Pathology, Dermatology     Comments:    Number of Specimens:->1  ------------------------->-------------------------  Spec 1 Procedure:->Biopsy  Spec 1 Clinical Impression:->erythematous urticarial non  scaly papules coalescing into plaques, started after breast  biopsy where she underwent general anesthesia and had metal  clip placed 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 Area marked and prepped with alcohol. Approximately 1cc of 1% lidocaine with epinephrine injected. 4 mm disposable punch used to remove lesion. Hemostasis obtained and biopsy site closed with 1 - 2 Prolene sutures. Wound care instructions reviewed with patient and handout given.    Pruritus  -     cetirizine (ZYRTEC) 5 MG tablet; Take 1 tablet (5 mg total) by mouth once daily.  Dispense: 30 tablet; Refill: 2  -     hydrOXYzine HCL (ATARAX) 25 MG tablet; Take 1 tablet (25 mg total) by mouth nightly.  Dispense: 30 tablet; Refill: 2  -     triamcinolone acetonide 0.1% (KENALOG) 0.1 % cream; Apply topically 2 (two) times daily.  Dispense: 454 g;  Refill: 1  -     predniSONE (DELTASONE) 10 MG tablet; Take 40mg x 4 days in the AM, then 30mg x 4 days in the AM, then 20mg x 4 days in the AM, then 10mg x 4 days in the AM.  Dispense: 40 tablet; Refill: 0  Switch claritin to zyrtec  Switch benadryl to hydroxyzine nightly   Prednisone taper instructions in the morning   Triamcinolone cream twice daily - thick coat- cerave anti itch on top   Discussed benefits and risks of treatment with prednisone including but not limited to increased appetite, weight gain, irritability, insomnia, fluid retention, GI upset, increased blood pressure, and increased blood sugars. If Prednisone is needed long term (>4 weeks), additional side effects such as kidney stones, gastric ulceration, avascular necrosis of femoral head may be encountered.               No follow-ups on file.

## 2024-10-14 LAB
FINAL PATHOLOGIC DIAGNOSIS: NORMAL
GROSS: NORMAL
Lab: NORMAL
MICROSCOPIC EXAM: NORMAL

## 2024-10-15 ENCOUNTER — TELEPHONE (OUTPATIENT)
Dept: DERMATOLOGY | Facility: CLINIC | Age: 59
End: 2024-10-15
Payer: COMMERCIAL

## 2024-10-15 NOTE — TELEPHONE ENCOUNTER
----- Message from Ashley sent at 10/15/2024  8:56 AM CDT -----  Contact: Claire  Type:  Patient Returning Call    Who Called:Claire  Who Left Message for Patient:Nurse  Does the patient know what this is regarding?:unknown  Would the patient rather a call back or a response via [facility]? Call back  Best Call Back Number:[phone]  Additional Information: Claire missed a call and would like a call back    Thanks  ADRIANA

## 2024-10-21 ENCOUNTER — PATIENT MESSAGE (OUTPATIENT)
Dept: DERMATOLOGY | Facility: CLINIC | Age: 59
End: 2024-10-21
Payer: COMMERCIAL

## 2024-10-28 ENCOUNTER — TELEPHONE (OUTPATIENT)
Facility: CLINIC | Age: 59
End: 2024-10-28
Payer: COMMERCIAL

## 2024-10-30 ENCOUNTER — CLINICAL SUPPORT (OUTPATIENT)
Dept: RADIATION ONCOLOGY | Facility: CLINIC | Age: 59
End: 2024-10-30
Payer: COMMERCIAL

## 2024-10-30 VITALS
HEART RATE: 104 BPM | WEIGHT: 168.38 LBS | SYSTOLIC BLOOD PRESSURE: 132 MMHG | OXYGEN SATURATION: 96 % | DIASTOLIC BLOOD PRESSURE: 89 MMHG | BODY MASS INDEX: 28.91 KG/M2 | RESPIRATION RATE: 16 BRPM

## 2024-10-30 DIAGNOSIS — C50.911 INVASIVE DUCTAL CARCINOMA OF BREAST, FEMALE, RIGHT: Primary | ICD-10-CM

## 2024-10-30 RX ORDER — PREDNISONE 10 MG/1
20 TABLET ORAL DAILY
Qty: 40 TABLET | Refills: 0 | Status: SHIPPED | OUTPATIENT
Start: 2024-10-30

## 2024-11-05 ENCOUNTER — TREATMENT (OUTPATIENT)
Dept: RADIATION ONCOLOGY | Facility: CLINIC | Age: 59
End: 2024-11-05
Payer: COMMERCIAL

## 2024-11-05 PROCEDURE — 77331 SPECIAL RADIATION DOSIMETRY: CPT | Mod: S$GLB,,, | Performed by: RADIOLOGY

## 2024-11-05 PROCEDURE — G6012 RADIATION TREATMENT DELIVERY: HCPCS | Mod: S$GLB,,, | Performed by: RADIOLOGY

## 2024-11-06 ENCOUNTER — TELEPHONE (OUTPATIENT)
Facility: CLINIC | Age: 59
End: 2024-11-06
Payer: COMMERCIAL

## 2024-11-11 ENCOUNTER — PATIENT MESSAGE (OUTPATIENT)
Dept: DERMATOLOGY | Facility: CLINIC | Age: 59
End: 2024-11-11
Payer: COMMERCIAL

## 2024-11-12 ENCOUNTER — TREATMENT (OUTPATIENT)
Dept: RADIATION ONCOLOGY | Facility: CLINIC | Age: 59
End: 2024-11-12
Payer: COMMERCIAL

## 2024-11-12 PROCEDURE — G6012 RADIATION TREATMENT DELIVERY: HCPCS | Mod: S$GLB,,, | Performed by: RADIOLOGY

## 2024-11-13 ENCOUNTER — OFFICE VISIT (OUTPATIENT)
Facility: CLINIC | Age: 59
End: 2024-11-13
Payer: COMMERCIAL

## 2024-11-13 VITALS
BODY MASS INDEX: 28.51 KG/M2 | SYSTOLIC BLOOD PRESSURE: 121 MMHG | DIASTOLIC BLOOD PRESSURE: 83 MMHG | WEIGHT: 167 LBS | HEART RATE: 101 BPM | RESPIRATION RATE: 16 BRPM | HEIGHT: 64 IN | TEMPERATURE: 98 F

## 2024-11-13 DIAGNOSIS — C50.911 INVASIVE DUCTAL CARCINOMA OF BREAST, FEMALE, RIGHT: Primary | ICD-10-CM

## 2024-11-13 PROCEDURE — 4010F ACE/ARB THERAPY RXD/TAKEN: CPT | Mod: CPTII,S$GLB,, | Performed by: INTERNAL MEDICINE

## 2024-11-13 PROCEDURE — 99999 PR PBB SHADOW E&M-EST. PATIENT-LVL IV: CPT | Mod: PBBFAC,,, | Performed by: INTERNAL MEDICINE

## 2024-11-13 PROCEDURE — 1159F MED LIST DOCD IN RCRD: CPT | Mod: CPTII,S$GLB,, | Performed by: INTERNAL MEDICINE

## 2024-11-13 PROCEDURE — G2211 COMPLEX E/M VISIT ADD ON: HCPCS | Mod: S$GLB,,, | Performed by: INTERNAL MEDICINE

## 2024-11-13 PROCEDURE — 3079F DIAST BP 80-89 MM HG: CPT | Mod: CPTII,S$GLB,, | Performed by: INTERNAL MEDICINE

## 2024-11-13 PROCEDURE — 3074F SYST BP LT 130 MM HG: CPT | Mod: CPTII,S$GLB,, | Performed by: INTERNAL MEDICINE

## 2024-11-13 PROCEDURE — 99215 OFFICE O/P EST HI 40 MIN: CPT | Mod: S$GLB,,, | Performed by: INTERNAL MEDICINE

## 2024-11-13 PROCEDURE — 3008F BODY MASS INDEX DOCD: CPT | Mod: CPTII,S$GLB,, | Performed by: INTERNAL MEDICINE

## 2024-11-13 RX ORDER — VALSARTAN 160 MG/1
160 TABLET ORAL NIGHTLY
COMMUNITY

## 2024-11-13 RX ORDER — CETIRIZINE HYDROCHLORIDE 10 MG/1
10 TABLET ORAL 2 TIMES DAILY
COMMUNITY

## 2024-11-13 RX ORDER — LANSOPRAZOLE 30 MG/1
30 CAPSULE, DELAYED RELEASE ORAL DAILY
COMMUNITY

## 2024-11-13 NOTE — LETTER
November 22, 2024        Chace Fraga NP  [address] [facility]  [place] [address]             [facility]  [address] [facility]  [address]  [place] [address]  Phone: [phone]  Fax: [phone]   Patient: Claire Guardado   MR Number: [id number]   YOB: 1965   Date of Visit: 11/13/2024       Dear Dr. Fraga:    Thank you for referring Claire Guardado to me for evaluation. Below are the relevant portions of my assessment and plan of care.            If you have questions, please do not hesitate to call me. I look forward to following Claire along with you.    Sincerely,      RAJ Escobar MD           CC  No Recipients

## 2024-11-13 NOTE — PROGRESS NOTES
[facility] Subsequent Encounter Note    11/13/24    Subjective:      Patient ID:   Claire Guardado  59 y.o. female  1965  FACUNDO Mohr MD      Chief Complaint:   L breast cancer    HPI:  59 y.o. female had an abnormal screening mammogram, with a 7 mm nodule area at the 10 o'clock position of the Right breast.  This had been seen initially in January, 2024  And subsequently confirmed in August , 2024.  Biopsy returned invasive ductal carcinoma, grade 1, ERP was positive, PRP was positive, HER2 Morro was 1+ negative.  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  The primary tumor per the biopsy was at least 7 mm.  Case ID number is S  from September 4, 2024.  At [facility].    Partial mastectomy with lymph node biopsy showed a 11 mm invasive ductal carcinoma, grade 1, she had no positive sentinel node biopsy.  This was a pT1C pN0 M0 ERP positive PRP positive HER2   Morro negative stage I disease.  Hot flashes not a problem here, she does have history of Sjogren syndrome, no history of DVT.  She is 59, post partum and status post partial hysterectomy.    Oncotype DX supports 3% recurrence with adjuvant tamoxifen or Arimidex, adjuvant chemotherapy is not indicated here.  She is presently undergoing radiation therapy per Dr. Kapoor, skin at the breast is intact.  She will follow-up here in late December to discuss and make decisions regarding adjuvant tamoxifen or Arimidex.    She has history of COVID infection in 2019.  She did take the COVID vaccinations and subsequently developed headache, hypertension, and short-term vertigo.    She admits now to having hypertension and GERD.  She does have history of joint symptoms and a positive SHASTA.  She has been seen per a rheumatologist and told that she might have Sjogren's syndrome.    She had some type of systemic contact dermatitis, she has seen Dr. Day and   Miguel.    Medications include lisinopril 20 mg daily, amlodipine 5 mg daily, Nexium 20 mg 1 p.o. daily, aspirin 81 mg p.o. daily.  She took birth control pills for approximately 18 years and did not take hormone replacement therapy.    She is listed as having allergy to Augmentin as manifested as feeling flushed.    She does not smoke, she does not drink alcohol, and she is a registered nurse retired.    She is status post partial hysterectomy in .  She had onset of menses at age 12, she delivered her 1st and only child at the maternal age of 30.  She is a  1 para 1 miscarriage 0 individual.  She has not had history of breast biopsy prior to the current situation.  Her mother, sister, daughter has not had breast cancer.    Her mother has Sjogren's syndrome, osteoarthritis, hypertension, and CVA x2.  Her father had Alzheimer's disease, prostate cancer, he was a smoker.  Her brother is a smoker and has COPD, another brother is alive and well, a sister has history of kidney cancer and her daughter has history of thyroid disease, a paternal grandmother had uterine cancer.      ROS:   GEN: normal without any fever, night sweats or weight loss  HEENT: normal with no HA's, sore throat, stiff neck, changes in vision  CV: normal with no CP, SOB, PND, SEPULVEDA or orthopnea  PULM: normal with no SOB, cough, hemoptysis, sputum or pleuritic pain  GI: normal with no abdominal pain, nausea, vomiting, constipation, diarrhea, melanotic stools, BRBPR, or hematemesis  : normal with no hematuria, dysuria  BREAST: See HPI  SKIN:  Since the time of her breast biopsy.  She admits to having a irregular rash noted at the skin of the right pelvic area, pruritic.     Past Medical History:   Diagnosis Date    Breast cancer     GERD (gastroesophageal reflux disease)     Hypertension     Rash due to allergy      Past Surgical History:   Procedure Laterality Date    HYSTERECTOMY      MASTECTOMY, PARTIAL Right 2024    SENTINEL LYMPH  NODE BIOPSY Right 09/25/2024    X 2       Review of patient's allergies indicates:   Allergen Reactions    Augmentin [amoxicillin-pot clavulanate]     Clindamycin      Social History     Socioeconomic History    Marital status:    Tobacco Use    Smoking status: Never   Substance and Sexual Activity    Alcohol use: Never    Drug use: Never    Sexual activity: Yes     Partners: Male     Social Drivers of Health     Financial Resource Strain: Low Risk  (9/10/2024)    Overall Financial Resource Strain (CARDIA)     Difficulty of Paying Living Expenses: Not hard at all   Food Insecurity: No Food Insecurity (9/10/2024)    Hunger Vital Sign     Worried About Running Out of Food in the Last Year: Never true     Ran Out of Food in the Last Year: Never true   Physical Activity: Sufficiently Active (9/10/2024)    Exercise Vital Sign     Days of Exercise per Week: 7 days     Minutes of Exercise per Session: 30 min   Stress: No Stress Concern Present (9/10/2024)    [employer] [place] [employer] - Occupational Stress Questionnaire     Feeling of Stress : Not at all   Housing Stability: Unknown (9/10/2024)    Housing Stability Vital Sign     Unable to Pay for Housing in the Last Year: No         Current Outpatient Medications:     cetirizine (ZYRTEC) 10 MG tablet, Take 10 mg by mouth 2 (two) times a day., Disp: , Rfl:     hydrOXYzine HCL (ATARAX) 25 MG tablet, Take 1 tablet (25 mg total) by mouth nightly., Disp: 30 tablet, Rfl: 2    lansoprazole (PREVACID) 30 MG capsule, Take 30 mg by mouth once daily., Disp: , Rfl:     triamcinolone acetonide 0.1% (KENALOG) 0.1 % cream, Apply topically 2 (two) times daily., Disp: 454 g, Rfl: 1    valsartan (DIOVAN) 160 MG tablet, Take 160 mg by mouth every evening., Disp: , Rfl:     doxycycline (VIBRA-TABS) 100 MG tablet, Take 1 tablet (100 mg total) by mouth 2 (two) times daily., Disp: 14 tablet, Rfl: 0          Objective:   Vitals:  Blood pressure 121/83, pulse 101, temperature  "98.3 °F (36.8 °C), temperature source Temporal, resp. rate 16, height 5' 4" (1.626 m), weight 75.8 kg (167 lb).    Physical Examination:   GEN: no apparent distress, comfortable  HEAD: atraumatic and normocephalic  EYES: no pallor, no icterus  ENT:  no pharyngeal erythema, external ears WNL; no nasal discharge  NECK: no masses, thyroid normal, trachea midline, no LAD/LN's, supple  CV: RRR with no murmur; normal pulse  CHEST: Normal respiratory effort; CTAB; normal breath sounds; no wheeze or crackles  ABDOM: nontender and nondistended; soft, L/S NP, no rebound/guarding  MUSC/Skeletal: ROM normal; no crepitus; joints normal  EXTREM: no clubbing, cyanosis, inflammation or swelling, calves are nontender at the left and right leg.  SKIN:  At the skin overlying the right pelvic area she has an erythematous irregular rash noted, not raised, not warm, and nontender.  : no CVAT  NEURO: grossly intact; motor/sensory WNL;  no tremors  PSYCH: normal mood, affect and behavior  LYMPH: normal cervical, supraclavicular, axillary LN's  BREASTS:  The left and right breast are nontender, without discharge or ecchymoses, and without palpable mass.  She does not have palpable lymphadenopathy at the left or right axilla, supraclavicular or cervical areas.      Labs:   Lab Results   Component Value Date    WBC 7.11 01/13/2022    HGB 15.3 01/13/2022    HCT 44.8 01/13/2022    MCV 89 01/13/2022     01/13/2022    CMP  Sodium   Date Value Ref Range Status   01/13/2022 141 136 - 145 mmol/L Final     Potassium   Date Value Ref Range Status   01/13/2022 3.5 3.5 - 5.1 mmol/L Final     Chloride   Date Value Ref Range Status   01/13/2022 108 95 - 110 mmol/L Final     CO2   Date Value Ref Range Status   01/13/2022 21 (L) 23 - 29 mmol/L Final     Glucose   Date Value Ref Range Status   01/13/2022 115 (H) 70 - 110 mg/dL Final     BUN   Date Value Ref Range Status   01/13/2022 12 6 - 20 mg/dL Final     Creatinine   Date Value Ref Range Status "   01/13/2022 0.8 0.5 - 1.4 mg/dL Final     Calcium   Date Value Ref Range Status   01/13/2022 9.8 8.7 - 10.5 mg/dL Final     Total Protein   Date Value Ref Range Status   01/13/2022 7.6 6.0 - 8.4 g/dL Final     Albumin   Date Value Ref Range Status   01/13/2022 4.1 3.5 - 5.2 g/dL Final     Total Bilirubin   Date Value Ref Range Status   01/13/2022 0.4 0.1 - 1.0 mg/dL Final     Comment:     For infants and newborns, interpretation of results should be based  on gestational age, weight and in agreement with clinical  observations.    Premature Infant recommended reference ranges:  Up to 24 hours.............<8.0 mg/dL  Up to 48 hours............<12.0 mg/dL  3-5 days..................<15.0 mg/dL  6-29 days.................<15.0 mg/dL       Alkaline Phosphatase   Date Value Ref Range Status   01/13/2022 76 55 - 135 U/L Final     AST   Date Value Ref Range Status   01/13/2022 39 10 - 40 U/L Final     ALT   Date Value Ref Range Status   01/13/2022 86 (H) 10 - 44 U/L Final     Anion Gap   Date Value Ref Range Status   01/13/2022 12 8 - 16 mmol/L Final     eGFR if    Date Value Ref Range Status   01/13/2022 >60 >60 mL/min/1.73 m^2 Final     eGFR if non    Date Value Ref Range Status   01/13/2022 >60 >60 mL/min/1.73 m^2 Final     Comment:     Calculation used to obtain the estimated glomerular filtration  rate (eGFR) is the CKD-EPI equation.            Assessment:   (1) 59 y.o. female has clinical stage I A invasive ductal carcinoma at the 10 o'clock position of the right breast.  Ductal carcinoma is also seen.  The grade of the primary tumor is grade 1.  ERP is strongly positive, PRP is strongly positive, HER2 Morro is 1+ negative.  The pathology report from [facility] is dated September 9, 2024, case ID S .    (2) with a small tumor of this size and with nonpalpable lymphadenopathy at the right axilla, she could proceed with partial mastectomy and sentinel node biopsy followed by  adjuvant radiation therapy.  This would be my recommendation here.  She has seen Dr. Kapoor and has previously met with Dr. Stone.  She is to have the right lumpectomy and sentinel node biopsy procedure on September 25, 2024.  [facility].    Another option would be to do a right mastectomy and sentinel node biopsy without the addition of adjuvant radiation therapy here.    Another option would be to do the right mastectomy and sentinel node biopsy and to proceed with a prophylactic left mastectomy here.    (3) once we know from the pathology report from September 25, 2024 what the size of the primary tumor is, and whether or not the lymph nodes are positive or negative for metastatic disease, then we will request Oncotype DX testing with a genetic analysis of the primary tumor.  Oncotype DX testing here would give us an estimate of the potential benefit in reducing systemic recurrence risk when taking adjuvant hormonal, endocrine, antiestrogen therapy including tamoxifen or Arimidex.  Also expected the Oncotype DX testing would give us an assessment of any potential benefit of adjuvant chemotherapy here in addition to the endocrine therapy mentioned above.    (4) she had invasive ductal carcinoma, 11 mm tumor, negative sentinel node biopsy, ERP positive, PRP positive, HER2 Morro negative.  Stage I disease.  Oncotype DX testing supports 3% recurrence with adjuvant tamoxifen or Arimidex.  She is undergoing radiation adjuvantly at this time.  She will follow up with myself in late December to make decisions on adjuvant endocrine therapy.  She does not require adjuvant hormonal therapy.  Her surgeon of record is Dr. Lyman, her radiation physician is Dr. Kapoor, and her primary care is Chace Fraga NP.    Rogelio Saldaña MD  [facility]  11/13/24

## 2024-11-19 ENCOUNTER — TREATMENT (OUTPATIENT)
Dept: RADIATION ONCOLOGY | Facility: CLINIC | Age: 59
End: 2024-11-19
Payer: COMMERCIAL

## 2024-11-19 PROCEDURE — G6012 RADIATION TREATMENT DELIVERY: HCPCS | Mod: S$GLB,,, | Performed by: RADIOLOGY

## 2024-11-20 ENCOUNTER — PATIENT MESSAGE (OUTPATIENT)
Dept: DERMATOLOGY | Facility: CLINIC | Age: 59
End: 2024-11-20
Payer: COMMERCIAL

## 2024-11-20 ENCOUNTER — TELEPHONE (OUTPATIENT)
Dept: DERMATOLOGY | Facility: CLINIC | Age: 59
End: 2024-11-20
Payer: COMMERCIAL

## 2024-11-20 DIAGNOSIS — R21 RASH: Primary | ICD-10-CM

## 2024-11-21 ENCOUNTER — PATIENT MESSAGE (OUTPATIENT)
Dept: RADIATION ONCOLOGY | Facility: CLINIC | Age: 59
End: 2024-11-21

## 2024-11-21 ENCOUNTER — OFFICE VISIT (OUTPATIENT)
Dept: DERMATOLOGY | Facility: CLINIC | Age: 59
End: 2024-11-21
Payer: COMMERCIAL

## 2024-11-21 DIAGNOSIS — R21 RASH: Primary | ICD-10-CM

## 2024-11-21 PROCEDURE — 99214 OFFICE O/P EST MOD 30 MIN: CPT | Mod: S$GLB,,, | Performed by: STUDENT IN AN ORGANIZED HEALTH CARE EDUCATION/TRAINING PROGRAM

## 2024-11-21 PROCEDURE — 1160F RVW MEDS BY RX/DR IN RCRD: CPT | Mod: CPTII,S$GLB,, | Performed by: STUDENT IN AN ORGANIZED HEALTH CARE EDUCATION/TRAINING PROGRAM

## 2024-11-21 PROCEDURE — 4010F ACE/ARB THERAPY RXD/TAKEN: CPT | Mod: CPTII,S$GLB,, | Performed by: STUDENT IN AN ORGANIZED HEALTH CARE EDUCATION/TRAINING PROGRAM

## 2024-11-21 PROCEDURE — 87070 CULTURE OTHR SPECIMN AEROBIC: CPT | Performed by: STUDENT IN AN ORGANIZED HEALTH CARE EDUCATION/TRAINING PROGRAM

## 2024-11-21 PROCEDURE — 87186 SC STD MICRODIL/AGAR DIL: CPT | Performed by: STUDENT IN AN ORGANIZED HEALTH CARE EDUCATION/TRAINING PROGRAM

## 2024-11-21 PROCEDURE — 1159F MED LIST DOCD IN RCRD: CPT | Mod: CPTII,S$GLB,, | Performed by: STUDENT IN AN ORGANIZED HEALTH CARE EDUCATION/TRAINING PROGRAM

## 2024-11-21 RX ORDER — DOXYCYCLINE HYCLATE 100 MG
100 TABLET ORAL 2 TIMES DAILY
Qty: 14 TABLET | Refills: 0 | Status: SHIPPED | OUTPATIENT
Start: 2024-11-21

## 2024-11-21 NOTE — PROGRESS NOTES
"  Subjective:      Patient ID:  Claire Guardado is a 59 y.o. female who presents for   Chief Complaint   Patient presents with    Rash     LOV 10/10/24    Patient here today for f/u on rash. Patient states rash is very slightly better but still there and itchy. Used triamcinolone with Cerave - helped with the itch. Completed course of Prednisone - improved while on it but rash came back after.  She was off 16 day prednisone taper- then off of it. Cleared but then flared a few days later. Saw Dr. Kapoor- told to switch deodorant and detergent. Started on 10/30/24 on prednisone 20mg daily until she saw allergy. Stopped the prednisone on 11/8 due to stomach upset.  On 11/11 saw allergist Dr. Rivera - switched some medications around. Now only on zyrtec 10mg BID (5mg previously- this was started bc of the rash), hydroxyzine nightly, was on esomeprazole for years - changed to prevacid, and was on amlodipine and lisinopril for years but switched to valsartan. They discontinued the asa.    Recommenced stopping triamcinolone and started jojoba oil with Vaseline - started to get red bumps so discontinued.    Previous meds:    Current Outpatient Medications:   ·  amLODIPine (NORVASC) 5 MG tablet, Take 5 mg by mouth once daily., Disp: , Rfl:   ·  aspirin (ASPIR-81 ORAL), Take 81 mg by mouth once daily., Disp: , Rfl:   ·  diphenhydrAMINE (BENADRYL) 50 MG capsule, Take 50 mg by mouth nightly as needed for Itching., Disp: , Rfl:   ·  esomeprazole (NEXIUM) 20 mg GrPS, Take 20 mg by mouth before breakfast., Disp: , Rfl:   ·  lisinopriL (PRINIVIL,ZESTRIL) 20 MG tablet, Take 20 mg by mouth once daily., Disp: , Rfl:   ·  loratadine (CLARITIN) 10 mg tablet, Take 10 mg by mouth once daily., Disp: , Rfl:          Dr. Rivera's Note 11/11:  She does mention nickel sensitivity to "cheaper earrings" and other nickel related items.     Never patch tested.     She doesn't recall sensitivity with vanilla, cinnamon, or basalm of peru type " fragrances but may not be aware if ingesting.     Looking at her medications, she has aspirin, nexium, lisinopril, zyrtec 5 mg, white 0.1 %, amlodiopine, and hydroxyzine.     Discussed with patient we can d/c aspirin, change nexium, change lisinopril and change amlodipine with permission of her pcp.       Final Pathologic Diagnosis   1. Skin, left back, punch biopsy:   - MILD EPIDERMAL SPONGIOSIS WITH MIXED SUPERFICIAL TO MID DERMAL INFLAMMATORY INFILTRATE (see comment)    Comment:  These histologic findings would be compatible with both drug eruption and contact reaction.  Clinical correlation is recommended.  No cancer cells noted on histologic examination. Your provider will correlate this pathology report with your clinical findings.    Hx:  Invasive ductal carcinoma - currently getting radiation.  No recent illnesses but had a headache for a few days.   On 9/11 she developed a rash on her abdomen. Lesion is fixed and and spreading states that it is itchy, stings, and is dry.  9/16- she saw Dr. Escobar and Dr. Norton.   She was started claritin.   9/23- saw her rheumatology in [facility] - has hx of sjogren's   9/24- Dr. Chace Fraga- referred here   9/25- had lumpectomy   9/30- Dr. Amezcua - GP-  has prednisone 15mg x 1, 10mg x 1, 5mg x 1- given by rheumatology for achiness- only uses 4 times a year- so Seven started 15mg x 2 days, 10 mg x 1 day, 5mg x 5 days- 9/30- 10/6 - did not help.   She then started triamcinolone cream, cerave cream, cerave soap, caladryl.      Patient also has diagnoses of Sjogren's      No new medications- on her current regimen for years  Denies taking any supplements.              Review of Systems   Constitutional:  Negative for fever, chills and fatigue.   Respiratory:  Negative for cough and shortness of breath.    Gastrointestinal:  Negative for nausea, vomiting and diarrhea.   Skin:  Positive for itching, rash and dry skin.       Objective:   Physical Exam   Constitutional: She  appears well-developed and well-nourished.   Neurological: She is alert and oriented to person, place, and time.   Psychiatric: She has a normal mood and affect.        Diagram Legend     Erythematous scaling macule/papule c/w actinic keratosis       Vascular papule c/w angioma      Pigmented verrucoid papule/plaque c/w seborrheic keratosis      Yellow umbilicated papule c/w sebaceous hyperplasia      Irregularly shaped tan macule c/w lentigo     1-2 mm smooth white papules consistent with Milia      Movable subcutaneous cyst with punctum c/w epidermal inclusion cyst      Subcutaneous movable cyst c/w pilar cyst      Firm pink to brown papule c/w dermatofibroma      Pedunculated fleshy papule(s) c/w skin tag(s)      Evenly pigmented macule c/w junctional nevus     Mildly variegated pigmented, slightly irregular-bordered macule c/w mildly atypical nevus      Flesh colored to evenly pigmented papule c/w intradermal nevus       Pink pearly papule/plaque c/w basal cell carcinoma      Erythematous hyperkeratotic cursted plaque c/w SCC      Surgical scar with no sign of skin cancer recurrence      Open and closed comedones      Inflammatory papules and pustules      Verrucoid papule consistent consistent with wart     Erythematous eczematous patches and plaques     Dystrophic onycholytic nail with subungual debris c/w onychomycosis     Umbilicated papule    Erythematous-base heme-crusted tan verrucoid plaque consistent with inflamed seborrheic keratosis     Erythematous Silvery Scaling Plaque c/w Psoriasis     See annotation                    Assessment / Plan:        Rash  -     doxycycline (VIBRA-TABS) 100 MG tablet; Take 1 tablet (100 mg total) by mouth 2 (two) times daily.  Dispense: 14 tablet; Refill: 0  -     Aerobic culture  Dx: systemic contact (caused by metal implant) vs. Drug eruption (all medications were changed)  Restart tac 0.1% cream BID   Discussed benefits and risks of doxycyline therapy including but  not limited to GI discomfort, esophageal irritation/ulceration, and increased sun sensitivity. Patient was counseled to take medicine with meals and at least 1 hour before lying down.   Scheduled for patch testing- avoid systemic steroids for now as this can interfere w/ testing  Message sent to Dr. Kapoor about areas of radiation  Reviewed labs- eosinophil count is 9.5%, overall WBC count WNL, liver and kidney function WNL  Reviewed allergist note           No follow-ups on file.

## 2024-11-23 LAB — BACTERIA SPEC AEROBE CULT: ABNORMAL

## 2024-11-25 ENCOUNTER — PATIENT MESSAGE (OUTPATIENT)
Dept: DERMATOLOGY | Facility: CLINIC | Age: 59
End: 2024-11-25
Payer: COMMERCIAL

## 2024-11-25 DIAGNOSIS — A49.8: Primary | ICD-10-CM

## 2024-11-25 RX ORDER — SULFAMETHOXAZOLE AND TRIMETHOPRIM 800; 160 MG/1; MG/1
1 TABLET ORAL 2 TIMES DAILY
Qty: 14 TABLET | Refills: 0 | Status: SHIPPED | OUTPATIENT
Start: 2024-11-25 | End: 2024-12-02

## 2024-11-27 ENCOUNTER — TREATMENT (OUTPATIENT)
Dept: RADIATION ONCOLOGY | Facility: CLINIC | Age: 59
End: 2024-11-27
Payer: COMMERCIAL

## 2024-11-27 PROCEDURE — G6012 RADIATION TREATMENT DELIVERY: HCPCS | Mod: S$GLB,,, | Performed by: RADIOLOGY

## 2024-11-27 PROCEDURE — 77336 RADIATION PHYSICS CONSULT: CPT | Mod: S$GLB,,, | Performed by: RADIOLOGY

## 2024-12-09 DIAGNOSIS — L29.9 PRURITUS: ICD-10-CM

## 2024-12-09 RX ORDER — HYDROXYZINE HYDROCHLORIDE 25 MG/1
25 TABLET, FILM COATED ORAL NIGHTLY
Qty: 30 TABLET | Refills: 2 | Status: SHIPPED | OUTPATIENT
Start: 2024-12-09

## 2024-12-24 ENCOUNTER — CLINICAL SUPPORT (OUTPATIENT)
Dept: RADIATION ONCOLOGY | Facility: CLINIC | Age: 59
End: 2024-12-24
Payer: COMMERCIAL

## 2024-12-24 DIAGNOSIS — C50.911 INVASIVE DUCTAL CARCINOMA OF BREAST, FEMALE, RIGHT: Primary | ICD-10-CM

## 2024-12-24 PROCEDURE — 99499 UNLISTED E&M SERVICE: CPT | Mod: 93,,, | Performed by: RADIOLOGY

## 2024-12-24 NOTE — PROGRESS NOTES
DIAGNOSIS: Stage IA, pT1cN0(sn)M0 g1 IDC UOQ R breast, ER+/SC+/HER2(-)     TREATMENT      Contacted patient today for 3 week checkup. The patient tolerated and has recovered from her adj RT very well.  She verbalized understanding to continue skin care moisturizing, and he has upcoming follow-up within the next 1-3 months with medical oncology.  It was explained to her that he will require at least biannual clinical breast exams as well as annual diagnostic mammograms going forward.  She will return to clinic annually here for exams and surveillance in conjunction w/ her MedOnc and surgery teams, or earlier as requested.    A/P  1.  Next dx MMG due by Aug 2025  2.  Follow-up with Dr. Escobar as directed  3.  Return to clinic 6m w/ Dr. FRANCE

## 2024-12-27 ENCOUNTER — OFFICE VISIT (OUTPATIENT)
Facility: CLINIC | Age: 59
End: 2024-12-27
Payer: COMMERCIAL

## 2024-12-27 VITALS
DIASTOLIC BLOOD PRESSURE: 86 MMHG | WEIGHT: 172 LBS | RESPIRATION RATE: 16 BRPM | SYSTOLIC BLOOD PRESSURE: 121 MMHG | TEMPERATURE: 98 F | BODY MASS INDEX: 29.37 KG/M2 | HEART RATE: 111 BPM | HEIGHT: 64 IN

## 2024-12-27 DIAGNOSIS — C50.911 INVASIVE DUCTAL CARCINOMA OF BREAST, FEMALE, RIGHT: Primary | ICD-10-CM

## 2024-12-27 PROCEDURE — G2211 COMPLEX E/M VISIT ADD ON: HCPCS | Mod: S$GLB,,, | Performed by: INTERNAL MEDICINE

## 2024-12-27 PROCEDURE — 4010F ACE/ARB THERAPY RXD/TAKEN: CPT | Mod: CPTII,S$GLB,, | Performed by: INTERNAL MEDICINE

## 2024-12-27 PROCEDURE — 3008F BODY MASS INDEX DOCD: CPT | Mod: CPTII,S$GLB,, | Performed by: INTERNAL MEDICINE

## 2024-12-27 PROCEDURE — 3079F DIAST BP 80-89 MM HG: CPT | Mod: CPTII,S$GLB,, | Performed by: INTERNAL MEDICINE

## 2024-12-27 PROCEDURE — 99215 OFFICE O/P EST HI 40 MIN: CPT | Mod: S$GLB,,, | Performed by: INTERNAL MEDICINE

## 2024-12-27 PROCEDURE — 99999 PR PBB SHADOW E&M-EST. PATIENT-LVL III: CPT | Mod: PBBFAC,,, | Performed by: INTERNAL MEDICINE

## 2024-12-27 PROCEDURE — 3074F SYST BP LT 130 MM HG: CPT | Mod: CPTII,S$GLB,, | Performed by: INTERNAL MEDICINE

## 2024-12-27 PROCEDURE — 1159F MED LIST DOCD IN RCRD: CPT | Mod: CPTII,S$GLB,, | Performed by: INTERNAL MEDICINE

## 2024-12-27 NOTE — LETTER
December 28, 2024        Chace Fraga NP  [address]  [place] [address]             [facility]  [address] [facility]  [address]  [place] [address]  Phone: [phone]  Fax: [phone]   Patient: Claire Guardado   MR Number: [id number]   YOB: 1965   Date of Visit: 12/27/2024       Dear Dr. Fraga:    Thank you for referring Claire Guardado to me for evaluation. Below are the relevant portions of my assessment and plan of care.            If you have questions, please do not hesitate to call me. I look forward to following Claire along with you.    Sincerely,      RAJ Escobar MD           CC  No Recipients

## 2024-12-29 NOTE — PROGRESS NOTES
[facility] [address] Hematology Oncology In Office Subsequent Encounter Note    12/27/24    Subjective:      Patient ID:   Claire Guardado  59 y.o. female  1965  FACUNDO Mohr MD      Chief Complaint:   L breast cancer    HPI:  59 y.o. female had an abnormal screening mammogram, with a 7 mm nodule area at the 10 o'clock position of the Right breast.  This had been seen initially in January, 2024  And subsequently confirmed in August , 2024.  Biopsy returned invasive ductal carcinoma, grade 1, ERP was positive, PRP was positive, HER2 Morro was 1+ negative.  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  The primary tumor per the biopsy was at least 7 mm.  Case ID number is [id number]  from September 4, 2024.  At [facility].    Partial mastectomy with lymph node biopsy showed a 11 mm invasive ductal carcinoma, grade 1, she had no positive sentinel node biopsy.  This was a pT1C pN0 M0 ERP positive PRP positive HER2   Morro negative stage I disease.  Hot flashes not a problem here, she does have history of Sjogren syndrome, no history of DVT.  She is 59, post partum and status post partial hysterectomy.    Oncotype DX supports 3% recurrence with adjuvant tamoxifen or Arimidex, adjuvant chemotherapy is not indicated here.  She is presently undergoing radiation therapy per Dr. Kapoor, skin at the breast is intact.  She will follow-up here in late December to discuss and make decisions regarding adjuvant tamoxifen or Arimidex.    She has history of COVID infection in 2019.  She did take the COVID vaccinations and subsequently developed headache, hypertension, and short-term vertigo.    She admits now to having hypertension and GERD.  She does have history of joint symptoms and a positive SHASTA.  She has been seen per a rheumatologist and told that she might have Sjogren's syndrome.  She has her ovaries in place, but has had a hysterectomy.  She took BCP x's 20 years, no  HRT.  She has not had HF, arthritis sx or DVT.    She had some type of systemic contact dermatitis, she has seen Dr. Day and Dr. Rivera.  MDs are doing allergy testing on her.      Medications include lisinopril 20 mg daily, amlodipine 5 mg daily, Nexium 20 mg 1 p.o. daily, aspirin 81 mg p.o. daily.  She took birth control pills for approximately 18 years and did not take hormone replacement therapy.    RTC 4 weeks, to decide on T/A adjuvant preventive Rx.    She is listed as having allergy to Augmentin as manifested as feeling flushed.    She does not smoke, she does not drink alcohol, and she is a registered nurse retired.    She is status post partial hysterectomy in .  She had onset of menses at age 12, she delivered her 1st and only child at the maternal age of 30.  She is a  1 para 1 miscarriage 0 individual.  She has not had history of breast biopsy prior to the current situation.  Her mother, sister, daughter has not had breast cancer.    Her mother has Sjogren's syndrome, osteoarthritis, hypertension, and CVA x2.  Her father had Alzheimer's disease, prostate cancer, he was a smoker.  Her brother is a smoker and has COPD, another brother is alive and well, a sister has history of kidney cancer and her daughter has history of thyroid disease, a paternal grandmother had uterine cancer.      ROS:   GEN: normal without any fever, night sweats or weight loss  HEENT: normal with no HA's, sore throat, stiff neck, changes in vision  CV: normal with no CP, SOB, PND, SEPULVEDA or orthopnea  PULM: normal with no SOB, cough, hemoptysis, sputum or pleuritic pain  GI: normal with no abdominal pain, nausea, vomiting, constipation, diarrhea, melanotic stools, BRBPR, or hematemesis  : normal with no hematuria, dysuria  BREAST: See HPI  SKIN:  Since the time of her breast biopsy.  She admits to having a irregular rash noted at the skin of the right pelvic area, pruritic.     Past Medical History:   Diagnosis Date     Breast cancer     GERD (gastroesophageal reflux disease)     Hypertension     Rash due to allergy      Past Surgical History:   Procedure Laterality Date    HYSTERECTOMY      MASTECTOMY, PARTIAL Right 09/2024    SENTINEL LYMPH NODE BIOPSY Right 09/25/2024    X 2       Review of patient's allergies indicates:   Allergen Reactions    Augmentin [amoxicillin-pot clavulanate]     Clindamycin      Social History     Socioeconomic History    Marital status:    Tobacco Use    Smoking status: Never   Substance and Sexual Activity    Alcohol use: Never    Drug use: Never    Sexual activity: Yes     Partners: Male     Social Drivers of Health     Financial Resource Strain: Low Risk  (9/10/2024)    Overall Financial Resource Strain (CARDIA)     Difficulty of Paying Living Expenses: Not hard at all   Food Insecurity: No Food Insecurity (9/10/2024)    Hunger Vital Sign     Worried About Running Out of Food in the Last Year: Never true     Ran Out of Food in the Last Year: Never true   Physical Activity: Sufficiently Active (9/10/2024)    Exercise Vital Sign     Days of Exercise per Week: 7 days     Minutes of Exercise per Session: 30 min   Stress: No Stress Concern Present (9/10/2024)    [employer] - Occupational Stress Questionnaire     Feeling of Stress : Not at all   Housing Stability: Unknown (9/10/2024)    Housing Stability Vital Sign     Unable to Pay for Housing in the Last Year: No         Current Outpatient Medications:     cetirizine (ZYRTEC) 10 MG tablet, Take 10 mg by mouth 2 (two) times a day., Disp: , Rfl:     hydrOXYzine HCL (ATARAX) 25 MG tablet, TAKE ONE TABLET BY MOUTH NIGHTLY, Disp: 30 tablet, Rfl: 2    lansoprazole (PREVACID) 30 MG capsule, Take 30 mg by mouth once daily., Disp: , Rfl:     valsartan (DIOVAN) 160 MG tablet, Take 160 mg by mouth every evening., Disp: , Rfl:     doxycycline (VIBRA-TABS) 100 MG tablet, Take 1 tablet (100 mg total) by mouth 2 (two) times daily.,  "Disp: 14 tablet, Rfl: 0    triamcinolone acetonide 0.1% (KENALOG) 0.1 % cream, Apply topically 2 (two) times daily., Disp: 454 g, Rfl: 1          Objective:   Vitals:  Blood pressure 121/86, pulse (!) 111, temperature 97.9 °F (36.6 °C), temperature source Temporal, resp. rate 16, height 5' 4" (1.626 m), weight 78 kg (172 lb).    Physical Examination:   GEN: no apparent distress, comfortable  HEAD: atraumatic and normocephalic  EYES: no pallor, no icterus  ENT:  no pharyngeal erythema, external ears WNL; no nasal discharge  NECK: no masses, thyroid normal, trachea midline, no LAD/LN's, supple  CV: RRR with no murmur; normal pulse  CHEST: Normal respiratory effort; CTAB; normal breath sounds; no wheeze or crackles  ABDOM: nontender and nondistended; soft, L/S NP, no rebound/guarding  MUSC/Skeletal: ROM normal; no crepitus; joints normal  EXTREM: no clubbing, cyanosis, inflammation or swelling, calves are nontender at the left and right leg.  SKIN:  At the skin overlying the right pelvic area she has an erythematous irregular rash noted, not raised, not warm, and nontender.  : no CVAT  NEURO: grossly intact; motor/sensory WNL;  no tremors  PSYCH: normal mood, affect and behavior  LYMPH: normal cervical, supraclavicular, axillary LN's  BREASTS:  The left and right breast are nontender, without discharge or ecchymoses, and without palpable mass.  She does not have palpable lymphadenopathy at the left or right axilla, supraclavicular or cervical areas.      Labs:   Lab Results   Component Value Date    WBC 7.11 01/13/2022    HGB 15.3 01/13/2022    HCT 44.8 01/13/2022    MCV 89 01/13/2022     01/13/2022    CMP  Sodium   Date Value Ref Range Status   01/13/2022 141 136 - 145 mmol/L Final     Potassium   Date Value Ref Range Status   01/13/2022 3.5 3.5 - 5.1 mmol/L Final     Chloride   Date Value Ref Range Status   01/13/2022 108 95 - 110 mmol/L Final     CO2   Date Value Ref Range Status   01/13/2022 21 (L) 23 - 29 " mmol/L Final     Glucose   Date Value Ref Range Status   01/13/2022 115 (H) 70 - 110 mg/dL Final     BUN   Date Value Ref Range Status   01/13/2022 12 6 - 20 mg/dL Final     Creatinine   Date Value Ref Range Status   01/13/2022 0.8 0.5 - 1.4 mg/dL Final     Calcium   Date Value Ref Range Status   01/13/2022 9.8 8.7 - 10.5 mg/dL Final     Total Protein   Date Value Ref Range Status   01/13/2022 7.6 6.0 - 8.4 g/dL Final     Albumin   Date Value Ref Range Status   01/13/2022 4.1 3.5 - 5.2 g/dL Final     Total Bilirubin   Date Value Ref Range Status   01/13/2022 0.4 0.1 - 1.0 mg/dL Final     Comment:     For infants and newborns, interpretation of results should be based  on gestational age, weight and in agreement with clinical  observations.    Premature Infant recommended reference ranges:  Up to 24 hours.............<8.0 mg/dL  Up to 48 hours............<12.0 mg/dL  3-5 days..................<15.0 mg/dL  6-29 days.................<15.0 mg/dL       Alkaline Phosphatase   Date Value Ref Range Status   01/13/2022 76 55 - 135 U/L Final     AST   Date Value Ref Range Status   01/13/2022 39 10 - 40 U/L Final     ALT   Date Value Ref Range Status   01/13/2022 86 (H) 10 - 44 U/L Final     Anion Gap   Date Value Ref Range Status   01/13/2022 12 8 - 16 mmol/L Final     eGFR if    Date Value Ref Range Status   01/13/2022 >60 >60 mL/min/1.73 m^2 Final     eGFR if non    Date Value Ref Range Status   01/13/2022 >60 >60 mL/min/1.73 m^2 Final     Comment:     Calculation used to obtain the estimated glomerular filtration  rate (eGFR) is the CKD-EPI equation.            Assessment:   (1) 59 y.o. female has clinical stage I A invasive ductal carcinoma at the 10 o'clock position of the right breast.  Ductal carcinoma is also seen.  The grade of the primary tumor is grade 1.  ERP is strongly positive, PRP is strongly positive, HER2 Morro is 1+ negative.  The pathology report from [facility] is  dated September 9, 2024, case ID S .    (2) with a small tumor of this size and with nonpalpable lymphadenopathy at the right axilla, she could proceed with partial mastectomy and sentinel node biopsy followed by adjuvant radiation therapy.  This would be my recommendation here.  She has seen Dr. Kapoor and has previously met with Dr. Stone.  She is to have the right lumpectomy and sentinel node biopsy procedure on September 25, 2024.  .    Another option would be to do a right mastectomy and sentinel node biopsy without the addition of adjuvant radiation therapy here.    Another option would be to do the right mastectomy and sentinel node biopsy and to proceed with a prophylactic left mastectomy here.    (3) once we know from the pathology report from September 25, 2024 what the size of the primary tumor is, and whether or not the lymph nodes are positive or negative for metastatic disease, then we will request Oncotype DX testing with a genetic analysis of the primary tumor.  Oncotype DX testing here would give us an estimate of the potential benefit in reducing systemic recurrence risk when taking adjuvant hormonal, endocrine, antiestrogen therapy including tamoxifen or Arimidex.  Also expected the Oncotype DX testing would give us an assessment of any potential benefit of adjuvant chemotherapy here in addition to the endocrine therapy mentioned above.    (4) she had invasive ductal carcinoma, 11 mm tumor, negative sentinel node biopsy, ERP positive, PRP positive, HER2 Morro negative.  Stage I disease.  Oncotype DX testing supports 3% recurrence with adjuvant tamoxifen or Arimidex.  She is S/P  radiation adjuvantly at this time.    Her surgeon of record is Dr. Lyman, her radiation physician is Dr. Kapoor, and her primary care is Chace Fraga NP.    RTC 4 weeks, for T/A decision.  oRgelio Saldaña MD  [facility]  12/27/24

## 2024-12-31 DIAGNOSIS — R21 RASH: Primary | ICD-10-CM

## 2025-01-06 ENCOUNTER — CLINICAL SUPPORT (OUTPATIENT)
Dept: DERMATOLOGY | Facility: CLINIC | Age: 60
End: 2025-01-06
Payer: COMMERCIAL

## 2025-01-06 DIAGNOSIS — L23.9 ALLERGIC CONTACT DERMATITIS, UNSPECIFIED TRIGGER: Primary | ICD-10-CM

## 2025-01-06 PROCEDURE — 95044 PATCH/APPLICATION TESTS: CPT | Mod: S$GLB,,, | Performed by: DERMATOLOGY

## 2025-01-06 NOTE — PROGRESS NOTES
Pt here today for TRUE test patch testing.  46 allergens were applied to pt's clear skin of the mid to upper back.  Pt reminded not to shower/bathe/wash hair and to avoid activities that cause sweating.  Pt to return on Wednesday for removal of the patches and for the 1st reading.   Panel 1  1   Nickel Sulfate  1-   2- 7   Colophony  1-   2-   2   Wool Alcohols  1-   2- 8   Paraben Mix  1-   2-   3   Neomycin Sulfate  1- 2- 9   Negative Control  1-   2-   4   Potassium Dichromate  1-   2- 10   Balsam of [place]  1-   2-   5   Juventino Mix  1- 2- 11   Ethylenediamine Dihydrochloride  1-   2-   6   Fragrance Mix  1- 2- 12   Cobalt Dichloride  1-   2-     Panel 2  13   p-tert Butylphenol  Formaldehyde Resin  1- 2- 19   Methyldibromo Glutaronitrile  1- 2-   14   Epoxy Resin  1- 2- 20   p-Phenylenediamine  1- 2-   15   Carba Mix  1- 2- 21   Formaldehyde  1- 2-   16   Black Rubber Mix  1- 2- 22   Mercapto Mix  1- 2-   17   Cl+ Me-Isothiazolinone  1- 2- 23  Thimerosal  1- 2-   18   Quaternium-15  1- 2- 24   Thiuram Mix  1-   2-     Panel 3  25   Diazolidinyl Urea  1- 2- 31 Hydrocortisone-17-Butyrate  1- 2-   26   Quinoline mix  1- 2- 32 Mercaptobenzothiazole  1- 2-   27   Tixocortol-21-Pivalate  1- 2- 33   Bacitracin  1- 2-   28   Composite mix  1- 2- 34   Parthenolide  1- 2-   29   Imidazolidinyl Urea  1- 2- 35   Disperse Blue 106  1- 2-   30  Budesonide  1- 2- 36  2-Bromo-2-Nitropropane-1,3-diol  1-   2-     Panel 4  41 Cinnamic-aldehyde  1- 2- 46  Ethyl acrylate  1- 2-   42 Propylene glycol  1- 2- 47 Methyl methacrylate  1- 2-   43  Cocamidopropyl betaine  1- 2- 48 Chlorhexidine  1- 2-   44  Tea tree oil  1- 2- 49 Benzisothiazolinone  1- 2-   45 Propolis B  1- 2- 50  Clobetasol 17 propionate  1- 2-

## 2025-01-08 ENCOUNTER — CLINICAL SUPPORT (OUTPATIENT)
Dept: DERMATOLOGY | Facility: CLINIC | Age: 60
End: 2025-01-08
Payer: COMMERCIAL

## 2025-01-08 DIAGNOSIS — L23.9 ALLERGIC CONTACT DERMATITIS, UNSPECIFIED TRIGGER: Primary | ICD-10-CM

## 2025-01-08 NOTE — PROGRESS NOTES
Pt here to have her TRUE test patches removed which was done and no positive reactions noted  Pt reminded not to shower/bathe/wash hair and to avoid activities that cause sweating.  Pt to return on Friday, 1/10 to see Dr Randle and for the final read.   Panel 1  1   Nickel Sulfate  1-   2- 7   Colophony  1-   2-   2   Wool Alcohols  1-   2- 8   Paraben Mix  1-   2-   3   Neomycin Sulfate  1- 2- 9   Negative Control  1- 2-   4   Potassium Dichromate  1- 2- 10   Balsam of [place]  1-   2-   5   Juventino Mix  1- 2- 11   Ethylenediamine Dihydrochloride  1-   2-   6   Fragrance Mix  1- 2- 12   Cobalt Dichloride  1-   2-     Panel 2  13   p-tert Butylphenol  Formaldehyde Resin  1- 2- 19   Methyldibromo Glutaronitrile  1- 2-   14   Epoxy Resin  1- 2- 20   p-Phenylenediamine  1-   2-   15   Carba Mix  1- 2- 21   Formaldehyde  1- 2-   16   Black Rubber Mix  1- 2- 22   Mercapto Mix  1- 2-   17   Cl+ Me-Isothiazolinone  1- 2- 23  Thimerosal  1- 2-   18   Quaternium-15  1- 2- 24   Thiuram Mix  1-   2-     Panel 3  25   Diazolidinyl Urea  1- 2- 31 Hydrocortisone-17-Butyrate  1- 2-   26   Quinoline mix  1- 2- 32 Mercaptobenzothiazole  1- 2-   27   Tixocortol-21-Pivalate  1- 2- 33   Bacitracin  1- 2-   28   Composite mix  1- 2- 34   Parthenolide  1- 2-   29   Imidazolidinyl Urea  1- 2- 35   Disperse Blue 106  1- 2- 30  Budesonide  1- 2- 36  2-Bromo-2-Nitropropane-1,3-diol  1-   2-     Panel 4  41 Cinnamic-aldehyde  1- 2- 46  Ethyl acrylate  1- 2-   42 Propylene glycol  1- 2- 47 Methyl methacrylate  1- 2-   43  Cocamidopropyl betaine  1- 2- 48 Chlorhexidine  1- 2-   44  Tea tree oil  1- 2- 49 Benzisothiazolinone  1- 2-   45 Propolis B  1- 2- 50  Clobetasol 17 propionate  1- 2-

## 2025-01-10 ENCOUNTER — PATIENT MESSAGE (OUTPATIENT)
Dept: DERMATOLOGY | Facility: CLINIC | Age: 60
End: 2025-01-10

## 2025-01-10 ENCOUNTER — CLINICAL SUPPORT (OUTPATIENT)
Dept: DERMATOLOGY | Facility: CLINIC | Age: 60
End: 2025-01-10
Payer: COMMERCIAL

## 2025-01-10 DIAGNOSIS — L23.9 ALLERGIC CONTACT DERMATITIS, UNSPECIFIED TRIGGER: Primary | ICD-10-CM

## 2025-01-10 NOTE — PROGRESS NOTES
Pt here today for the final reading of her patch testing.  No positive reactions noted to the allergens.   Panel 1  1   Nickel Sulfate  1-   2- 7   Colophony  1-   2-   2   Wool Alcohols  1-   2- 8   Paraben Mix  1-   2-   3   Neomycin Sulfate  1- 2- 9   Negative Control  1-   2-   4   Potassium Dichromate  1-   2- 10   Balsam of [place]  1-   2-   5   Juventino Mix  1- 2- 11   Ethylenediamine Dihydrochloride  1-   2-   6   Fragrance Mix  1- 2- 12   Cobalt Dichloride  1-   2-     Panel 2  13   p-tert Butylphenol  Formaldehyde Resin  1- 2- 19   Methyldibromo Glutaronitrile  1- 2-   14   Epoxy Resin  1- 2- 20   p-Phenylenediamine  1- 2-   15   Carba Mix  1- 2- 21   Formaldehyde  1- 2-   16   Black Rubber Mix  1- 2- 22   Mercapto Mix  1- 2-   17   Cl+ Me-Isothiazolinone  1- 2- 23  Thimerosal  1- 2-   18   Quaternium-15  1- 2- 24   Thiuram Mix  1- 2-     Panel 3  25   Diazolidinyl Urea  1- 2- 31 Hydrocortisone-17-Butyrate  1- 2-   26   Quinoline mix  1- 2- 32 Mercaptobenzothiazole  1- 2- 27   Tixocortol-21-Pivalate  1- 2- 33   Bacitracin  1- 2- 28   Composite mix  1- 2- 34   Parthenolide  1- 2- 29   Imidazolidinyl Urea  1- 2- 35   Disperse Blue 106  1- 2- 30  Budesonide  1- 2- 36  2-Bromo-2-Nitropropane-1,3-diol  1- 2-     Panel 4  41 Cinnamic-aldehyde  1- 2- 46  Ethyl acrylate  1- 2-   42 Propylene glycol  1- 2- 47 Methyl methacrylate  1- 2-   43  Cocamidopropyl betaine  1- 2- 48 Chlorhexidine  1- 2-   44  Tea tree oil  1- 2- 49 Benzisothiazolinone  1- 2-   45 Propolis B  1- 2- 50  Clobetasol 17 propionate  1- 2-

## 2025-01-11 DIAGNOSIS — D72.819 LEUKOPENIA, UNSPECIFIED TYPE: Primary | ICD-10-CM

## 2025-01-13 ENCOUNTER — TELEPHONE (OUTPATIENT)
Dept: DERMATOLOGY | Facility: CLINIC | Age: 60
End: 2025-01-13

## 2025-01-13 NOTE — TELEPHONE ENCOUNTER
----- Message from Bridget Day MD sent at 1/11/2025  1:12 PM CST -----  Please let her know I reviewed her labs.   Looks like her eosinophil count  (allergy cell) is coming down. How is her rash doing?  I would like to recheck just her blood count in 4 weeks. I placed orders  Liver nd kidney function looks great.

## 2025-01-13 NOTE — TELEPHONE ENCOUNTER
Pt notified of lab results and recommendations.  Stated that she just has one itchy spot on her left side but that is all.

## 2025-01-28 ENCOUNTER — OFFICE VISIT (OUTPATIENT)
Facility: CLINIC | Age: 60
End: 2025-01-28
Payer: COMMERCIAL

## 2025-01-28 VITALS
WEIGHT: 175.81 LBS | SYSTOLIC BLOOD PRESSURE: 129 MMHG | RESPIRATION RATE: 16 BRPM | HEART RATE: 94 BPM | HEIGHT: 64 IN | TEMPERATURE: 98 F | BODY MASS INDEX: 30.02 KG/M2 | DIASTOLIC BLOOD PRESSURE: 82 MMHG

## 2025-01-28 DIAGNOSIS — C50.911 INVASIVE DUCTAL CARCINOMA OF BREAST, FEMALE, RIGHT: Primary | ICD-10-CM

## 2025-01-28 PROCEDURE — 3079F DIAST BP 80-89 MM HG: CPT | Mod: CPTII,S$GLB,, | Performed by: INTERNAL MEDICINE

## 2025-01-28 PROCEDURE — G2211 COMPLEX E/M VISIT ADD ON: HCPCS | Mod: S$GLB,,, | Performed by: INTERNAL MEDICINE

## 2025-01-28 PROCEDURE — 99215 OFFICE O/P EST HI 40 MIN: CPT | Mod: S$GLB,,, | Performed by: INTERNAL MEDICINE

## 2025-01-28 PROCEDURE — 4010F ACE/ARB THERAPY RXD/TAKEN: CPT | Mod: CPTII,S$GLB,, | Performed by: INTERNAL MEDICINE

## 2025-01-28 PROCEDURE — 99999 PR PBB SHADOW E&M-EST. PATIENT-LVL III: CPT | Mod: PBBFAC,,, | Performed by: INTERNAL MEDICINE

## 2025-01-28 PROCEDURE — 3074F SYST BP LT 130 MM HG: CPT | Mod: CPTII,S$GLB,, | Performed by: INTERNAL MEDICINE

## 2025-01-28 PROCEDURE — 1159F MED LIST DOCD IN RCRD: CPT | Mod: CPTII,S$GLB,, | Performed by: INTERNAL MEDICINE

## 2025-01-28 PROCEDURE — 3008F BODY MASS INDEX DOCD: CPT | Mod: CPTII,S$GLB,, | Performed by: INTERNAL MEDICINE

## 2025-01-28 RX ORDER — TAMOXIFEN CITRATE 20 MG/1
20 TABLET ORAL DAILY
Qty: 30 TABLET | Refills: 11 | Status: SHIPPED | OUTPATIENT
Start: 2025-01-28 | End: 2026-01-28

## 2025-01-28 NOTE — LETTER
February 2, 2025        Chace Fraga NP  [address]  [place] [address]             [facility]  [address] [facility]  [address]  [place] [address]  Phone: [phone]  Fax: [phone]   Patient: Claire Guardado   MR Number: [id number]   YOB: 1965   Date of Visit: 1/28/2025       Dear Dr. Fraga:    Thank you for referring Claire Guardado to me for evaluation. Below are the relevant portions of my assessment and plan of care.            If you have questions, please do not hesitate to call me. I look forward to following Claire along with you.    Sincerely,      RAJ Escobar MD           CC  No Recipients

## 2025-01-28 NOTE — PROGRESS NOTES
[facility] [address] Hematology Oncology In Office Subsequent Encounter Note    1/28/25    Subjective:      Patient ID:   Claire Guardado  60 y.o. female  1965  FACUNDO Mohr MD Ed Mannina, MD      Chief Complaint:   L breast cancer    HPI:  60 y.o. female had an abnormal screening mammogram, with a 7 mm nodule area at the 10 o'clock position of the Right breast.  This had been seen initially in January, 2024  And subsequently confirmed in August , 2024.  Biopsy returned invasive ductal carcinoma, grade 1, ERP was positive, PRP was positive, HER2 Morro was 1+ negative.  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  The primary tumor per the biopsy was at least 7 mm.  Case ID number is [id number]  from September 4, 2024.  At [facility].    Partial mastectomy with lymph node biopsy showed a 11 mm invasive ductal carcinoma, grade 1, she had no positive sentinel node biopsy.  This was a pT1C pN0 M0 ERP positive PRP positive HER2   Morro negative , stage I disease.  Hot flashes not a problem here, she does have history of Sjogren syndrome, no history of DVT.  She is 60, post partum and status post partial hysterectomy.    Oncotype DX supports 3% recurrence with adjuvant tamoxifen or Arimidex, adjuvant chemotherapy is not indicated here.  She has completed radiation therapy per Dr. Kapoor, skin at the breast is intact.    She is 60.  Status post partial hysterectomy.  She complains of easy fatigue.  She has history of Sjogren syndrome with hip and back pain symptoms.  We have discussed the option of tamoxifen and Arimidex.  I have reviewed with her potential side effects of adjuvant endocrine therapy including hot flash symptoms, and joint pain symptoms while on these medications.  There can be slight increased risk of uterine cancer with tamoxifen usage, she has had partial hysterectomy.  We have decided to go with the tamoxifen 20 mg p.o. daily adjuvantly.  She  is concerned that Arimidex may aggravate her Sjogren syndrome and joint and bone pain symptoms.    She has history of COVID infection in 2019.  She did take the COVID vaccinations and subsequently developed headache, hypertension, and short-term vertigo.    She admits now to having hypertension and GERD.  She does have history of joint symptoms and a positive SHASTA.  She has been seen per a rheumatologist and told that she might have Sjogren's syndrome.  She has her ovaries in place, but has had a hysterectomy.  She took BCP x's 20 years, no HRT.  She has not had HF, arthritis sx or DVT.    She had some type of systemic contact dermatitis, she has seen Dr. Day and Dr. Rivera.  MDs are doing allergy testing on her.  The drug rash appears to have resolved.    Medications include lisinopril 20 mg daily, amlodipine 5 mg daily, Nexium 20 mg 1 p.o. daily, aspirin 81 mg p.o. daily.  She took birth control pills for approximately 18 years and did not take hormone replacement therapy.    RTC 4 weeks, to decide on T/A adjuvant preventive Rx.    She is listed as having allergy to Augmentin as manifested as feeling flushed.    She does not smoke, she does not drink alcohol, and she is a registered nurse retired.    She is status post partial hysterectomy in 2004.  She had onset of menses at age 12, she delivered her 1st and only child at the maternal age of 30.  She is a  1 para 1 miscarriage 0 individual.  She has not had history of breast biopsy prior to the current situation.  Her mother, sister, daughter has not had breast cancer.    Her mother has Sjogren's syndrome, osteoarthritis, hypertension, and CVA x2.  Her father had Alzheimer's disease, prostate cancer, he was a smoker.  Her brother is a smoker and has COPD, another brother is alive and well, a sister has history of kidney cancer and her daughter has history of thyroid disease, a paternal grandmother had uterine cancer.      ROS:   GEN: normal without any  fever, night sweats or weight loss  HEENT: normal with no HA's, sore throat, stiff neck, changes in vision  CV: normal with no CP, SOB, PND, SEPULVEDA or orthopnea  PULM: normal with no SOB, cough, hemoptysis, sputum or pleuritic pain  GI: normal with no abdominal pain, nausea, vomiting, constipation, diarrhea, melanotic stools, BRBPR, or hematemesis  : normal with no hematuria, dysuria  BREAST: See HPI  SKIN:  Since the time of her breast biopsy.  She admits to having a irregular rash noted at the skin of the right pelvic area, pruritic.     Past Medical History:   Diagnosis Date    Breast cancer     GERD (gastroesophageal reflux disease)     Hypertension     Rash due to allergy      Past Surgical History:   Procedure Laterality Date    HYSTERECTOMY      MASTECTOMY, PARTIAL Right 09/2024    SENTINEL LYMPH NODE BIOPSY Right 09/25/2024    X 2       Review of patient's allergies indicates:   Allergen Reactions    Augmentin [amoxicillin-pot clavulanate]     Clindamycin      Social History     Socioeconomic History    Marital status:    Tobacco Use    Smoking status: Never   Substance and Sexual Activity    Alcohol use: Never    Drug use: Never    Sexual activity: Yes     Partners: Male     Social Drivers of Health     Financial Resource Strain: Low Risk  (9/10/2024)    Overall Financial Resource Strain (CARDIA)     Difficulty of Paying Living Expenses: Not hard at all   Food Insecurity: No Food Insecurity (9/10/2024)    Hunger Vital Sign     Worried About Running Out of Food in the Last Year: Never true     Ran Out of Food in the Last Year: Never true   Physical Activity: Sufficiently Active (9/10/2024)    Exercise Vital Sign     Days of Exercise per Week: 7 days     Minutes of Exercise per Session: 30 min   Stress: No Stress Concern Present (9/10/2024)    [employer] [place] [employer] - Occupational Stress Questionnaire     Feeling of Stress : Not at all   Housing Stability: Unknown (9/10/2024)     "Housing Stability Vital Sign     Unable to Pay for Housing in the Last Year: No         Current Outpatient Medications:     cetirizine (ZYRTEC) 10 MG tablet, Take 10 mg by mouth 2 (two) times a day., Disp: , Rfl:     doxycycline (VIBRA-TABS) 100 MG tablet, Take 1 tablet (100 mg total) by mouth 2 (two) times daily., Disp: 14 tablet, Rfl: 0    hydrOXYzine HCL (ATARAX) 25 MG tablet, TAKE ONE TABLET BY MOUTH NIGHTLY, Disp: 30 tablet, Rfl: 2    lansoprazole (PREVACID) 30 MG capsule, Take 30 mg by mouth once daily., Disp: , Rfl:     triamcinolone acetonide 0.1% (KENALOG) 0.1 % cream, Apply topically 2 (two) times daily., Disp: 454 g, Rfl: 1    valsartan (DIOVAN) 160 MG tablet, Take 160 mg by mouth every evening., Disp: , Rfl:     tamoxifen (NOLVADEX) 20 MG Tab, Take 1 tablet (20 mg total) by mouth once daily., Disp: 30 tablet, Rfl: 11          Objective:   Vitals:  Blood pressure 129/82, pulse 94, temperature 98.2 °F (36.8 °C), temperature source Temporal, resp. rate 16, height 5' 4" (1.626 m), weight 79.7 kg (175 lb 12.8 oz).    Physical Examination:   GEN: no apparent distress, comfortable  HEAD: atraumatic and normocephalic  EYES: no pallor, no icterus  ENT:  no pharyngeal erythema, external ears WNL; no nasal discharge  NECK: no masses, thyroid normal, trachea midline, no LAD/LN's, supple  CV: RRR with no murmur; normal pulse  CHEST: Normal respiratory effort; CTAB; normal breath sounds; no wheeze or crackles  ABDOM: nontender and nondistended; soft, L/S NP, no rebound/guarding  MUSC/Skeletal: ROM normal; no crepitus; joints normal  EXTREM: no clubbing, cyanosis, inflammation or swelling, calves are nontender at the left and right leg.  SKIN:  At the skin overlying the right pelvic area she has an erythematous irregular rash noted, not raised, not warm, and nontender.  : no CVAT  NEURO: grossly intact; motor/sensory WNL;  no tremors  PSYCH: normal mood, affect and behavior  LYMPH: normal cervical, supraclavicular, " axillary LN's  BREASTS:  The left and right breast are nontender, without discharge or ecchymoses, and without palpable mass.  She does not have palpable lymphadenopathy at the left or right axilla, supraclavicular or cervical areas.      Labs:   Lab Results   Component Value Date    WBC 7.11 01/13/2022    HGB 15.3 01/13/2022    HCT 44.8 01/13/2022    MCV 89 01/13/2022     01/13/2022    CMP  Sodium   Date Value Ref Range Status   01/13/2022 141 136 - 145 mmol/L Final     Potassium   Date Value Ref Range Status   01/13/2022 3.5 3.5 - 5.1 mmol/L Final     Chloride   Date Value Ref Range Status   01/13/2022 108 95 - 110 mmol/L Final     CO2   Date Value Ref Range Status   01/13/2022 21 (L) 23 - 29 mmol/L Final     Glucose   Date Value Ref Range Status   01/13/2022 115 (H) 70 - 110 mg/dL Final     BUN   Date Value Ref Range Status   01/13/2022 12 6 - 20 mg/dL Final     Creatinine   Date Value Ref Range Status   01/13/2022 0.8 0.5 - 1.4 mg/dL Final     Calcium   Date Value Ref Range Status   01/13/2022 9.8 8.7 - 10.5 mg/dL Final     Total Protein   Date Value Ref Range Status   01/13/2022 7.6 6.0 - 8.4 g/dL Final     Albumin   Date Value Ref Range Status   01/13/2022 4.1 3.5 - 5.2 g/dL Final     Total Bilirubin   Date Value Ref Range Status   01/13/2022 0.4 0.1 - 1.0 mg/dL Final     Comment:     For infants and newborns, interpretation of results should be based  on gestational age, weight and in agreement with clinical  observations.    Premature Infant recommended reference ranges:  Up to 24 hours.............<8.0 mg/dL  Up to 48 hours............<12.0 mg/dL  3-5 days..................<15.0 mg/dL  6-29 days.................<15.0 mg/dL       Alkaline Phosphatase   Date Value Ref Range Status   01/13/2022 76 55 - 135 U/L Final     AST   Date Value Ref Range Status   01/13/2022 39 10 - 40 U/L Final     ALT   Date Value Ref Range Status   01/13/2022 86 (H) 10 - 44 U/L Final     Anion Gap   Date Value Ref Range  Status   01/13/2022 12 8 - 16 mmol/L Final     eGFR if    Date Value Ref Range Status   01/13/2022 >60 >60 mL/min/1.73 m^2 Final     eGFR if non    Date Value Ref Range Status   01/13/2022 >60 >60 mL/min/1.73 m^2 Final     Comment:     Calculation used to obtain the estimated glomerular filtration  rate (eGFR) is the CKD-EPI equation.            Assessment:   (1) 60 y.o. female has clinical stage I A invasive ductal carcinoma at the 10 o'clock position of the right breast.  Ductal carcinoma is also seen.  The grade of the primary tumor is grade 1.  ERP is strongly positive, PRP is strongly positive, HER2 Morro is 1+ negative.  The pathology report from [facility] is dated September 9, 2024, case ID S .    (2) with a small tumor of this size and with nonpalpable lymphadenopathy at the right axilla, she could proceed with partial mastectomy and sentinel node biopsy followed by adjuvant radiation therapy.  This would be my recommendation here.  She has seen Dr. Kapoor and has previously met with Dr. Stone.  She is to have the right lumpectomy and sentinel node biopsy procedure on September 25, 2024.  [facility].    Another option would be to do a right mastectomy and sentinel node biopsy without the addition of adjuvant radiation therapy here.    Another option would be to do the right mastectomy and sentinel node biopsy and to proceed with a prophylactic left mastectomy here.    (3) once we know from the pathology report from September 25, 2024 what the size of the primary tumor is, and whether or not the lymph nodes are positive or negative for metastatic disease, then we will request Oncotype DX testing with a genetic analysis of the primary tumor.  Oncotype DX testing here would give us an estimate of the potential benefit in reducing systemic recurrence risk when taking adjuvant hormonal, endocrine, antiestrogen therapy including tamoxifen or Arimidex.  Also  expected the Oncotype DX testing would give us an assessment of any potential benefit of adjuvant chemotherapy here in addition to the endocrine therapy mentioned above.    (4) she had invasive ductal carcinoma, 11 mm tumor, negative sentinel node biopsy, ERP positive, PRP positive, HER2 Morro negative.  Stage I disease.  Oncotype DX testing supports 3% recurrence with adjuvant tamoxifen or Arimidex.  She is S/P  radiation adjuvantly at this time.    Her surgeon of record is Dr. Lyman, her radiation physician is Dr. Kapoor, and her primary care is Chace Fraga NP.    (5) begin tamoxifen 20 mg p.o. daily adjuvantly, day 1 will be February 1, 2025.    RTC 4-6 weeks, for Tamoxifen review of side effects and tolerance of medication.    Rogelio Saldaña MD  [facility]  1/28/25

## 2025-02-10 ENCOUNTER — PATIENT MESSAGE (OUTPATIENT)
Dept: DERMATOLOGY | Facility: CLINIC | Age: 60
End: 2025-02-10
Payer: COMMERCIAL

## 2025-03-11 ENCOUNTER — OFFICE VISIT (OUTPATIENT)
Facility: CLINIC | Age: 60
End: 2025-03-11
Payer: COMMERCIAL

## 2025-03-11 VITALS
OXYGEN SATURATION: 96 % | HEART RATE: 95 BPM | TEMPERATURE: 98 F | SYSTOLIC BLOOD PRESSURE: 120 MMHG | HEIGHT: 64 IN | BODY MASS INDEX: 29.76 KG/M2 | WEIGHT: 174.31 LBS | RESPIRATION RATE: 16 BRPM | DIASTOLIC BLOOD PRESSURE: 70 MMHG

## 2025-03-11 DIAGNOSIS — C50.911 INVASIVE DUCTAL CARCINOMA OF BREAST, FEMALE, RIGHT: Primary | ICD-10-CM

## 2025-03-11 PROCEDURE — G2211 COMPLEX E/M VISIT ADD ON: HCPCS | Mod: S$GLB,,, | Performed by: INTERNAL MEDICINE

## 2025-03-11 PROCEDURE — 1159F MED LIST DOCD IN RCRD: CPT | Mod: CPTII,S$GLB,, | Performed by: INTERNAL MEDICINE

## 2025-03-11 PROCEDURE — 3078F DIAST BP <80 MM HG: CPT | Mod: CPTII,S$GLB,, | Performed by: INTERNAL MEDICINE

## 2025-03-11 PROCEDURE — 3074F SYST BP LT 130 MM HG: CPT | Mod: CPTII,S$GLB,, | Performed by: INTERNAL MEDICINE

## 2025-03-11 PROCEDURE — 4010F ACE/ARB THERAPY RXD/TAKEN: CPT | Mod: CPTII,S$GLB,, | Performed by: INTERNAL MEDICINE

## 2025-03-11 PROCEDURE — 99999 PR PBB SHADOW E&M-EST. PATIENT-LVL IV: CPT | Mod: PBBFAC,,, | Performed by: INTERNAL MEDICINE

## 2025-03-11 PROCEDURE — 3008F BODY MASS INDEX DOCD: CPT | Mod: CPTII,S$GLB,, | Performed by: INTERNAL MEDICINE

## 2025-03-11 PROCEDURE — 99215 OFFICE O/P EST HI 40 MIN: CPT | Mod: S$GLB,,, | Performed by: INTERNAL MEDICINE

## 2025-03-11 NOTE — PROGRESS NOTES
[facility] [address] Hematology Oncology In Office Subsequent Encounter Note    3/11/25    Subjective:      Patient ID:   Claire Guardado  60 y.o. female  1965  FACUNDO Mohr MD Ed Mannina, MD      Chief Complaint:   L breast cancer    HPI:  60 y.o. female had an abnormal screening mammogram, with a 7 mm nodule area at the 10 o'clock position of the Right breast.  This had been seen initially in January, 2024  And subsequently confirmed in August , 2024.  Biopsy returned invasive ductal carcinoma, grade 1, ERP was positive, PRP was positive, HER2 Morro was 1+ negative.  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  The primary tumor per the biopsy was at least 7 mm.  Case ID number is [id number]  from September 4, 2024.  At [facility].    Partial mastectomy with lymph node biopsy showed a 11 mm invasive ductal carcinoma, grade 1, she had no positive sentinel node biopsy.  This was a pT1C pN0 M0 ERP positive PRP positive HER2   Morro negative , stage I disease.  Hot flashes not a problem here, she does have history of Sjogren syndrome, no history of DVT.  She is 60, post partum and status post partial hysterectomy.    Oncotype DX supports 3% recurrence with adjuvant tamoxifen or Arimidex, adjuvant chemotherapy is not indicated here.  She has completed radiation therapy per Dr. Kapoor, skin at the breast is intact.    She is 60.  Status post partial hysterectomy.  She complains of easy fatigue.  She has history of Sjogren syndrome with hip and back pain symptoms.  We have discussed the option of tamoxifen and Arimidex.  I have reviewed with her potential side effects of adjuvant endocrine therapy including hot flash symptoms, and joint pain symptoms while on these medications.  There can be slight increased risk of uterine cancer with tamoxifen usage, she has had partial hysterectomy.  We have decided to go with the tamoxifen 20 mg p.o. daily adjuvantly.  She  is concerned that Arimidex may aggravate her Sjogren syndrome and joint and bone pain symptoms.    She has history of COVID infection in 2019.  She did take the COVID vaccinations and subsequently developed headache, hypertension, and short-term vertigo.    She admits now to having hypertension and GERD.  She does have history of joint symptoms and a positive SHASTA.  She has been seen per a rheumatologist and told that she might have Sjogren's syndrome.  She has her ovaries in place, but has had a hysterectomy.  She took BCP x's 20 years, no HRT.  She has not had HF, arthritis sx or DVT.    She had some type of systemic contact dermatitis, she has seen Dr. Day and Dr. Rivera.  MDs are doing allergy testing on her.  The drug rash appears to have resolved.    Medications include lisinopril 20 mg daily, amlodipine 5 mg daily, Nexium 20 mg 1 p.o. daily, aspirin 81 mg p.o. daily.  She took birth control pills for approximately 18 years and did not take hormone replacement therapy.    RTC 4 weeks, to decide on T/A adjuvant preventive Rx.    She is listed as having allergy to Augmentin as manifested as feeling flushed.    She does not smoke, she does not drink alcohol, and she is a registered nurse retired.    She is status post partial hysterectomy in .  She had onset of menses at age 12, she delivered her 1st and only child at the maternal age of 30.  She is a  1 para 1 miscarriage 0 individual.  She has not had history of breast biopsy prior to the current situation.  Her mother, sister, daughter has not had breast cancer.    Her mother has Sjogren's syndrome, osteoarthritis, hypertension, and CVA x2.  Her father had Alzheimer's disease, prostate cancer, he was a smoker.  Her brother is a smoker and has COPD, another brother is alive and well, a sister has history of kidney cancer and her daughter has history of thyroid disease, a paternal grandmother had uterine cancer.    2025  she has been on  tamoxifen x6 weeks.  Hot flash symptoms have decreased to 4 per day.  Hair is  thinning and she does have intermittent nausea, however joints are without pain.  Previous skin rash has resolved without recurrence.  She is seeing a Doctor Adelfo of Rheumatology in [place] for positive SHASTA.  He does not think she has Sjogren syndrome.    Lab work to include CBC, CMP, breast cancer tumor markers will be done.  Have discussed CT DNA monitoring for breast cancer and she agrees to have this done, will order.  She follows up here in 3 months.    ROS:   GEN: normal without any fever, night sweats or weight loss  HEENT: normal with no HA's, sore throat, stiff neck, changes in vision  CV: normal with no CP, SOB, PND, SEPULVEDA or orthopnea  PULM: normal with no SOB, cough, hemoptysis, sputum or pleuritic pain  GI: normal with no abdominal pain, nausea, vomiting, constipation, diarrhea, melanotic stools, BRBPR, or hematemesis  : normal with no hematuria, dysuria  BREAST: See HPI  SKIN:  Since the time of her breast biopsy.  She admits to having a irregular rash noted at the skin of the right pelvic area, pruritic.     Past Medical History:   Diagnosis Date    Breast cancer     GERD (gastroesophageal reflux disease)     Hypertension     Rash due to allergy      Past Surgical History:   Procedure Laterality Date    HYSTERECTOMY      MASTECTOMY, PARTIAL Right 09/2024    SENTINEL LYMPH NODE BIOPSY Right 09/25/2024    X 2       Review of patient's allergies indicates:   Allergen Reactions    Augmentin [amoxicillin-pot clavulanate]     Clindamycin      Social History     Socioeconomic History    Marital status:    Tobacco Use    Smoking status: Never   Substance and Sexual Activity    Alcohol use: Never    Drug use: Never    Sexual activity: Yes     Partners: Male     Social Drivers of Health     Financial Resource Strain: Patient Declined (3/5/2025)    Overall Financial Resource Strain (CARDIA)     Difficulty of Paying Living  "Expenses: Patient declined   Food Insecurity: Patient Declined (3/5/2025)    Hunger Vital Sign     Worried About Running Out of Food in the Last Year: Patient declined     Ran Out of Food in the Last Year: Patient declined   Transportation Needs: Patient Declined (3/5/2025)    PRAPARE - Transportation     Lack of Transportation (Medical): Patient declined     Lack of Transportation (Non-Medical): Patient declined   Physical Activity: Sufficiently Active (3/5/2025)    Exercise Vital Sign     Days of Exercise per Week: 7 days     Minutes of Exercise per Session: 30 min   Stress: Patient Declined (3/5/2025)    Citizen of [place] [employer] - Occupational Stress Questionnaire     Feeling of Stress : Patient declined   Housing Stability: Patient Declined (3/5/2025)    Housing Stability Vital Sign     Unable to Pay for Housing in the Last Year: Patient declined     Homeless in the Last Year: Patient declined         Current Outpatient Medications:     lansoprazole (PREVACID) 30 MG capsule, Take 30 mg by mouth once daily., Disp: , Rfl:     PRENATAL 21-IRON FU-FOLIC ACID ORAL, Take by mouth., Disp: , Rfl:     tamoxifen (NOLVADEX) 20 MG Tab, Take 1 tablet (20 mg total) by mouth once daily., Disp: 30 tablet, Rfl: 11    valsartan (DIOVAN) 160 MG tablet, Take 160 mg by mouth every evening., Disp: , Rfl:     cetirizine (ZYRTEC) 10 MG tablet, Take 10 mg by mouth 2 (two) times a day., Disp: , Rfl:     doxycycline (VIBRA-TABS) 100 MG tablet, Take 1 tablet (100 mg total) by mouth 2 (two) times daily., Disp: 14 tablet, Rfl: 0    hydrOXYzine HCL (ATARAX) 25 MG tablet, TAKE ONE TABLET BY MOUTH NIGHTLY, Disp: 30 tablet, Rfl: 2    triamcinolone acetonide 0.1% (KENALOG) 0.1 % cream, Apply topically 2 (two) times daily., Disp: 454 g, Rfl: 1          Objective:   Vitals:  Blood pressure 120/70, pulse 95, temperature 98.1 °F (36.7 °C), temperature source Temporal, resp. rate 16, height 5' 4" (1.626 m), weight 79.1 kg (174 lb 4.8 " oz), SpO2 96%.    Physical Examination:   GEN: no apparent distress, comfortable  HEAD: atraumatic and normocephalic  EYES: no pallor, no icterus  ENT:  no pharyngeal erythema, external ears WNL; no nasal discharge  NECK: no masses, thyroid normal, trachea midline, no LAD/LN's, supple  CV: RRR with no murmur; normal pulse  CHEST: Normal respiratory effort; CTAB; normal breath sounds; no wheeze or crackles  ABDOM: nontender and nondistended; soft, L/S NP, no rebound/guarding  MUSC/Skeletal: ROM normal; no crepitus; joints normal  EXTREM: no clubbing, cyanosis, inflammation or swelling, calves are nontender at the left and right leg.  SKIN:  At the skin overlying the right pelvic area she has an erythematous irregular rash noted, not raised, not warm, and nontender.  : no CVAT  NEURO: grossly intact; motor/sensory WNL;  no tremors  PSYCH: normal mood, affect and behavior  LYMPH: normal cervical, supraclavicular, axillary LN's  BREASTS:  The left and right breast are nontender, without discharge or ecchymoses, and without palpable mass.  She does not have palpable lymphadenopathy at the left or right axilla, supraclavicular or cervical areas.      Labs:   Lab Results   Component Value Date    WBC 7.11 01/13/2022    HGB 15.3 01/13/2022    HCT 44.8 01/13/2022    MCV 89 01/13/2022     01/13/2022    CMP  Sodium   Date Value Ref Range Status   01/13/2022 141 136 - 145 mmol/L Final     Potassium   Date Value Ref Range Status   01/13/2022 3.5 3.5 - 5.1 mmol/L Final     Chloride   Date Value Ref Range Status   01/13/2022 108 95 - 110 mmol/L Final     CO2   Date Value Ref Range Status   01/13/2022 21 (L) 23 - 29 mmol/L Final     Glucose   Date Value Ref Range Status   01/13/2022 115 (H) 70 - 110 mg/dL Final     BUN   Date Value Ref Range Status   01/13/2022 12 6 - 20 mg/dL Final     Creatinine   Date Value Ref Range Status   01/13/2022 0.8 0.5 - 1.4 mg/dL Final     Calcium   Date Value Ref Range Status   01/13/2022 9.8  8.7 - 10.5 mg/dL Final     Total Protein   Date Value Ref Range Status   01/13/2022 7.6 6.0 - 8.4 g/dL Final     Albumin   Date Value Ref Range Status   01/13/2022 4.1 3.5 - 5.2 g/dL Final     Total Bilirubin   Date Value Ref Range Status   01/13/2022 0.4 0.1 - 1.0 mg/dL Final     Comment:     For infants and newborns, interpretation of results should be based  on gestational age, weight and in agreement with clinical  observations.    Premature Infant recommended reference ranges:  Up to 24 hours.............<8.0 mg/dL  Up to 48 hours............<12.0 mg/dL  3-5 days..................<15.0 mg/dL  6-29 days.................<15.0 mg/dL       Alkaline Phosphatase   Date Value Ref Range Status   01/13/2022 76 55 - 135 U/L Final     AST   Date Value Ref Range Status   01/13/2022 39 10 - 40 U/L Final     ALT   Date Value Ref Range Status   01/13/2022 86 (H) 10 - 44 U/L Final     Anion Gap   Date Value Ref Range Status   01/13/2022 12 8 - 16 mmol/L Final     eGFR if    Date Value Ref Range Status   01/13/2022 >60 >60 mL/min/1.73 m^2 Final     eGFR if non    Date Value Ref Range Status   01/13/2022 >60 >60 mL/min/1.73 m^2 Final     Comment:     Calculation used to obtain the estimated glomerular filtration  rate (eGFR) is the CKD-EPI equation.            Assessment:   (1) 60 y.o. female has clinical stage I A invasive ductal carcinoma at the 10 o'clock position of the right breast.  Ductal carcinoma is also seen.  The grade of the primary tumor is grade 1.  ERP is strongly positive, PRP is strongly positive, HER2 Morro is 1+ negative.  The pathology report from [facility] is dated September 9, 2024, case ID [id number] .    (2) with a small tumor of this size and with nonpalpable lymphadenopathy at the right axilla, she could proceed with partial mastectomy and sentinel node biopsy followed by adjuvant radiation therapy.  This would be my recommendation here.  She has seen Dr. Kapoor and  has previously met with Dr. Stone.  She is to have the right lumpectomy and sentinel node biopsy procedure on September 25, 2024.  [facility].    Another option would be to do a right mastectomy and sentinel node biopsy without the addition of adjuvant radiation therapy here.    Another option would be to do the right mastectomy and sentinel node biopsy and to proceed with a prophylactic left mastectomy here.    (3) once we know from the pathology report from September 25, 2024 what the size of the primary tumor is, and whether or not the lymph nodes are positive or negative for metastatic disease, then we will request Oncotype DX testing with a genetic analysis of the primary tumor.  Oncotype DX testing here would give us an estimate of the potential benefit in reducing systemic recurrence risk when taking adjuvant hormonal, endocrine, antiestrogen therapy including tamoxifen or Arimidex.  Also expected the Oncotype DX testing would give us an assessment of any potential benefit of adjuvant chemotherapy here in addition to the endocrine therapy mentioned above.    (4) she had invasive ductal carcinoma, 11 mm tumor, negative sentinel node biopsy, ERP positive, PRP positive, HER2 Morro negative.  Stage I disease.  Oncotype DX testing supports 3% recurrence with adjuvant tamoxifen or Arimidex.  She is S/P  radiation adjuvantly at this time.    Her surgeon of record is Dr. Lyman, her radiation physician is Dr. Kapoor, and her primary care is Chace Fraga NP.    (5) Tamoxifen 20 mg p.o. daily adjuvantly, day 1 will be February 1, 2025.    Will monitor CBC, CMP, breast cancer tumor markers and CT DNA.  She will continue on tamoxifen, she appears to be tolerating it well, rash has resolved.  She will follow-up here in 3 months.    Rogelio Saldaña MD  [facility]  3/11/25

## 2025-05-28 ENCOUNTER — PATIENT MESSAGE (OUTPATIENT)
Facility: CLINIC | Age: 60
End: 2025-05-28
Payer: COMMERCIAL

## 2025-06-10 ENCOUNTER — TELEPHONE (OUTPATIENT)
Facility: CLINIC | Age: 60
End: 2025-06-10
Payer: COMMERCIAL

## 2025-06-10 NOTE — TELEPHONE ENCOUNTER
Document on 6/6/2025  8:56 AM by Misty Zarate: ONCOTYPE DX REQUEST FOR MEDICAL RECORDS 6/6/25       Forwarded message to Honey COLUNGA RN

## 2025-06-11 ENCOUNTER — TELEPHONE (OUTPATIENT)
Facility: CLINIC | Age: 60
End: 2025-06-11
Payer: COMMERCIAL

## 2025-06-11 ENCOUNTER — OFFICE VISIT (OUTPATIENT)
Facility: CLINIC | Age: 60
End: 2025-06-11
Payer: COMMERCIAL

## 2025-06-11 VITALS
WEIGHT: 168 LBS | TEMPERATURE: 98 F | HEIGHT: 64 IN | DIASTOLIC BLOOD PRESSURE: 90 MMHG | RESPIRATION RATE: 16 BRPM | BODY MASS INDEX: 28.68 KG/M2 | SYSTOLIC BLOOD PRESSURE: 122 MMHG | HEART RATE: 102 BPM | OXYGEN SATURATION: 99 %

## 2025-06-11 DIAGNOSIS — L27.0 DRUG-INDUCED SKIN RASH: ICD-10-CM

## 2025-06-11 DIAGNOSIS — C50.911 INVASIVE DUCTAL CARCINOMA OF BREAST, FEMALE, RIGHT: Primary | ICD-10-CM

## 2025-06-11 PROCEDURE — 99215 OFFICE O/P EST HI 40 MIN: CPT | Mod: S$GLB,,, | Performed by: INTERNAL MEDICINE

## 2025-06-11 PROCEDURE — 1159F MED LIST DOCD IN RCRD: CPT | Mod: CPTII,S$GLB,, | Performed by: INTERNAL MEDICINE

## 2025-06-11 PROCEDURE — 3074F SYST BP LT 130 MM HG: CPT | Mod: CPTII,S$GLB,, | Performed by: INTERNAL MEDICINE

## 2025-06-11 PROCEDURE — 4010F ACE/ARB THERAPY RXD/TAKEN: CPT | Mod: CPTII,S$GLB,, | Performed by: INTERNAL MEDICINE

## 2025-06-11 PROCEDURE — 99999 PR PBB SHADOW E&M-EST. PATIENT-LVL IV: CPT | Mod: PBBFAC,,, | Performed by: INTERNAL MEDICINE

## 2025-06-11 PROCEDURE — 3008F BODY MASS INDEX DOCD: CPT | Mod: CPTII,S$GLB,, | Performed by: INTERNAL MEDICINE

## 2025-06-11 PROCEDURE — 3080F DIAST BP >= 90 MM HG: CPT | Mod: CPTII,S$GLB,, | Performed by: INTERNAL MEDICINE

## 2025-06-11 PROCEDURE — G2211 COMPLEX E/M VISIT ADD ON: HCPCS | Mod: S$GLB,,, | Performed by: INTERNAL MEDICINE

## 2025-06-11 RX ORDER — FAMOTIDINE 20 MG/1
20 TABLET, FILM COATED ORAL 2 TIMES DAILY
COMMUNITY
Start: 2025-05-29 | End: 2026-05-29

## 2025-06-11 NOTE — PROGRESS NOTES
[facility] [address] Hematology Oncology In Office Subsequent Encounter Note    6/11/25    Subjective:      Patient ID:   Claire Guardado  60 y.o. female  1965  FACUNDO Mohr MD Ed Mannina, MD      Chief Complaint:   L breast cancer    HPI:  60 y.o. female had an abnormal screening mammogram, with a 7 mm nodule area at the 10 o'clock position of the Right breast.  This had been seen initially in January, 2024  And subsequently confirmed in August , 2024.  Biopsy returned invasive ductal carcinoma, grade 1, ERP was positive, PRP was positive, HER2 Morro was 1+ negative.  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  The primary tumor per the biopsy was at least 7 mm.  Case ID number is [id number]  from September 4, 2024.  At [facility].    Partial mastectomy with lymph node biopsy showed a 11 mm invasive ductal carcinoma, grade 1, she had no positive sentinel node biopsy.  This was a pT1C pN0 M0 ERP positive PRP positive HER2   Morro negative , stage I disease.  Hot flashes not a problem here, she does have history of Sjogren syndrome, no history of DVT.  She is 60, post partum and status post partial hysterectomy.    Oncotype DX supports 3% recurrence with adjuvant tamoxifen or Arimidex, adjuvant chemotherapy is not indicated here.  She has completed radiation therapy per Dr. Kapoor, skin at the breast is intact.    She is 60.  Status post partial hysterectomy.  She complains of easy fatigue.  She has history of Sjogren syndrome with hip and back pain symptoms.  We have discussed the option of tamoxifen and Arimidex.  I have reviewed with her potential side effects of adjuvant endocrine therapy including hot flash symptoms, and joint pain symptoms while on these medications.  There can be slight increased risk of uterine cancer with tamoxifen usage, she has had partial hysterectomy.  We have decided to go with the tamoxifen 20 mg p.o. daily adjuvantly.  She  is concerned that Arimidex may aggravate her Sjogren syndrome and joint and bone pain symptoms.    She has history of COVID infection in 2019.  She did take the COVID vaccinations and subsequently developed headache, hypertension, and short-term vertigo.    She admits now to having hypertension and GERD.  She does have history of joint symptoms and a positive SHASTA.  She has been seen per a rheumatologist and told that she might have Sjogren's syndrome.  She has her ovaries in place, but has had a hysterectomy.  She took BCP x's 20 years, no HRT.  She has not had HF, arthritis sx or DVT.    She had some type of systemic contact dermatitis, she has seen Dr. Day and Dr. Rivera.  MDs are doing allergy testing on her.  The drug rash appears to have resolved.    Medications include lisinopril 20 mg daily, amlodipine 5 mg daily, Nexium 20 mg 1 p.o. daily, aspirin 81 mg p.o. daily.  She took birth control pills for approximately 18 years and did not take hormone replacement therapy.    T/A adjuvant preventive Rx.    She is listed as having allergy to Augmentin as manifested as feeling flushed.    She does not smoke, she does not drink alcohol, and she is a registered nurse retired.    She is status post partial hysterectomy in .  She had onset of menses at age 12, she delivered her 1st and only child at the maternal age of 30.  She is a  1 para 1 miscarriage 0 individual.  She has not had history of breast biopsy prior to the current situation.  Her mother, sister, daughter has not had breast cancer.    Her mother has Sjogren's syndrome, osteoarthritis, hypertension, and CVA x2.  Her father had Alzheimer's disease, prostate cancer, he was a smoker.  Her brother is a smoker and has COPD, another brother is alive and well, a sister has history of kidney cancer and her daughter has history of thyroid disease, a paternal grandmother had uterine cancer.    2025  she has been on tamoxifen x6 weeks.  Hot flash  symptoms have decreased to 4 per day.  Hair is  thinning and she does have intermittent nausea, however joints are without pain.  Previous skin rash has resolved without recurrence.  She is seeing a Doctor Adelfo of Rheumatology in [place] for positive SHASTA.  He does not think she has Sjogren syndrome.    Lab work to include CBC, CMP, breast cancer tumor markers will be done.  Have discussed CT DNA monitoring for breast cancer and she agrees to have this done, will order.  She follows up here in 3 months.    ROS:   GEN: normal without any fever, night sweats or weight loss  HEENT: normal with no HA's, sore throat, stiff neck, changes in vision  CV: normal with no CP, SOB, PND, SEPULVEDA or orthopnea  PULM: normal with no SOB, cough, hemoptysis, sputum or pleuritic pain  GI: normal with no abdominal pain, nausea, vomiting, constipation, diarrhea, melanotic stools, BRBPR, or hematemesis  : normal with no hematuria, dysuria  BREAST: See HPI  SKIN:  Since the time of her breast biopsy.  She admits to having a irregular rash noted at the skin of the right pelvic area, pruritic.     Past Medical History:   Diagnosis Date    Breast cancer     GERD (gastroesophageal reflux disease)     Hypertension     Rash due to allergy      Past Surgical History:   Procedure Laterality Date    HYSTERECTOMY      MASTECTOMY, PARTIAL Right 09/2024    SENTINEL LYMPH NODE BIOPSY Right 09/25/2024    X 2       Review of patient's allergies indicates:   Allergen Reactions    Augmentin [amoxicillin-pot clavulanate]     Clindamycin      Social History     Socioeconomic History    Marital status:    Tobacco Use    Smoking status: Never   Substance and Sexual Activity    Alcohol use: Never    Drug use: Never    Sexual activity: Yes     Partners: Male     Social Drivers of Health     Financial Resource Strain: Patient Declined (3/5/2025)    Overall Financial Resource Strain (CARDIA)     Difficulty of Paying Living Expenses: Patient declined    Food Insecurity: Patient Declined (3/5/2025)    Hunger Vital Sign     Worried About Running Out of Food in the Last Year: Patient declined     Ran Out of Food in the Last Year: Patient declined   Transportation Needs: Patient Declined (3/5/2025)    PRAPARE - Transportation     Lack of Transportation (Medical): Patient declined     Lack of Transportation (Non-Medical): Patient declined   Physical Activity: Sufficiently Active (3/5/2025)    Exercise Vital Sign     Days of Exercise per Week: 7 days     Minutes of Exercise per Session: 30 min   Stress: Patient Declined (3/5/2025)    [employer] - Occupational Stress Questionnaire     Feeling of Stress : Patient declined   Housing Stability: Patient Declined (3/5/2025)    Housing Stability Vital Sign     Unable to Pay for Housing in the Last Year: Patient declined     Homeless in the Last Year: Patient declined         Current Outpatient Medications:     cetirizine (ZYRTEC) 10 MG tablet, Take 10 mg by mouth 2 (two) times a day., Disp: , Rfl:     famotidine (PEPCID) 20 MG tablet, Take 20 mg by mouth 2 (two) times daily., Disp: , Rfl:     lansoprazole (PREVACID) 30 MG capsule, Take 30 mg by mouth once daily., Disp: , Rfl:     PRENATAL 21-IRON FU-FOLIC ACID ORAL, Take by mouth., Disp: , Rfl:     valsartan (DIOVAN) 160 MG tablet, Take 160 mg by mouth every evening., Disp: , Rfl:     doxycycline (VIBRA-TABS) 100 MG tablet, Take 1 tablet (100 mg total) by mouth 2 (two) times daily., Disp: 14 tablet, Rfl: 0    hydrOXYzine HCL (ATARAX) 25 MG tablet, TAKE ONE TABLET BY MOUTH NIGHTLY, Disp: 30 tablet, Rfl: 2    tamoxifen (NOLVADEX) 20 MG Tab, Take 1 tablet (20 mg total) by mouth once daily. (Patient not taking: Reported on 6/11/2025.), Disp: 30 tablet, Rfl: 11    triamcinolone acetonide 0.1% (KENALOG) 0.1 % cream, Apply topically 2 (two) times daily., Disp: 454 g, Rfl: 1          Objective:   Vitals:  Blood pressure (!) 122/90, pulse 102, temperature  "98.3 °F (36.8 °C), temperature source Temporal, resp. rate 16, height 5' 4" (1.626 m), weight 76.2 kg (168 lb), SpO2 99%.    Physical Examination:   GEN: no apparent distress, comfortable  HEAD: atraumatic and normocephalic  EYES: no pallor, no icterus  ENT:  no pharyngeal erythema, external ears WNL; no nasal discharge  NECK: no masses, thyroid normal, trachea midline, no LAD/LN's, supple  CV: RRR with no murmur; normal pulse  CHEST: Normal respiratory effort; CTAB; normal breath sounds; no wheeze or crackles  ABDOM: nontender and nondistended; soft, L/S NP, no rebound/guarding  MUSC/Skeletal: ROM normal; no crepitus; joints normal  EXTREM: no clubbing, cyanosis, inflammation or swelling, calves are nontender at the left and right leg.  SKIN:  At the skin overlying the right pelvic area she has an erythematous irregular rash noted, not raised, not warm, and nontender.  : no CVAT  NEURO: grossly intact; motor/sensory WNL;  no tremors  PSYCH: normal mood, affect and behavior  LYMPH: normal cervical, supraclavicular, axillary LN's  BREASTS:  The left and right breast are nontender, without discharge or ecchymoses, and without palpable mass.  She does not have palpable lymphadenopathy at the left or right axilla, supraclavicular or cervical areas.      Labs:   Lab Results   Component Value Date    WBC 7.11 01/13/2022    HGB 15.3 01/13/2022    HCT 44.8 01/13/2022    MCV 89 01/13/2022     01/13/2022    CMP  Sodium   Date Value Ref Range Status   01/13/2022 141 136 - 145 mmol/L Final     Potassium   Date Value Ref Range Status   01/13/2022 3.5 3.5 - 5.1 mmol/L Final     Chloride   Date Value Ref Range Status   01/13/2022 108 95 - 110 mmol/L Final     CO2   Date Value Ref Range Status   01/13/2022 21 (L) 23 - 29 mmol/L Final     Glucose   Date Value Ref Range Status   01/13/2022 115 (H) 70 - 110 mg/dL Final     BUN   Date Value Ref Range Status   01/13/2022 12 6 - 20 mg/dL Final     Creatinine   Date Value Ref " Range Status   01/13/2022 0.8 0.5 - 1.4 mg/dL Final     Calcium   Date Value Ref Range Status   01/13/2022 9.8 8.7 - 10.5 mg/dL Final     Total Protein   Date Value Ref Range Status   01/13/2022 7.6 6.0 - 8.4 g/dL Final     Albumin   Date Value Ref Range Status   01/13/2022 4.1 3.5 - 5.2 g/dL Final     Total Bilirubin   Date Value Ref Range Status   01/13/2022 0.4 0.1 - 1.0 mg/dL Final     Comment:     For infants and newborns, interpretation of results should be based  on gestational age, weight and in agreement with clinical  observations.    Premature Infant recommended reference ranges:  Up to 24 hours.............<8.0 mg/dL  Up to 48 hours............<12.0 mg/dL  3-5 days..................<15.0 mg/dL  6-29 days.................<15.0 mg/dL       Alkaline Phosphatase   Date Value Ref Range Status   01/13/2022 76 55 - 135 U/L Final     AST   Date Value Ref Range Status   01/13/2022 39 10 - 40 U/L Final     ALT   Date Value Ref Range Status   01/13/2022 86 (H) 10 - 44 U/L Final     Anion Gap   Date Value Ref Range Status   01/13/2022 12 8 - 16 mmol/L Final     eGFR if    Date Value Ref Range Status   01/13/2022 >60 >60 mL/min/1.73 m^2 Final     eGFR if non    Date Value Ref Range Status   01/13/2022 >60 >60 mL/min/1.73 m^2 Final     Comment:     Calculation used to obtain the estimated glomerular filtration  rate (eGFR) is the CKD-EPI equation.            Assessment:   (1) 60 y.o. female has clinical stage I A invasive ductal carcinoma at the 10 o'clock position of the right breast.  Ductal carcinoma is also seen.  The grade of the primary tumor is grade 1.  ERP is strongly positive, PRP is strongly positive, HER2 Morro is 1+ negative.  The pathology report from [facility] is dated September 9, 2024, case ID S .    (2) with a small tumor of this size and with nonpalpable lymphadenopathy at the right axilla, she could proceed with partial mastectomy and sentinel node  biopsy followed by adjuvant radiation therapy.  This would be my recommendation here.  She has seen Dr. Kapoor and has previously met with Dr. Stone.  She is to have the right lumpectomy and sentinel node biopsy procedure on September 25, 2024.  [facility].    Another option would be to do a right mastectomy and sentinel node biopsy without the addition of adjuvant radiation therapy here.    Another option would be to do the right mastectomy and sentinel node biopsy and to proceed with a prophylactic left mastectomy here.    (3) once we know from the pathology report from September 25, 2024 what the size of the primary tumor is, and whether or not the lymph nodes are positive or negative for metastatic disease, then we will request Oncotype DX testing with a genetic analysis of the primary tumor.  Oncotype DX testing here would give us an estimate of the potential benefit in reducing systemic recurrence risk when taking adjuvant hormonal, endocrine, antiestrogen therapy including tamoxifen or Arimidex.  Also expected the Oncotype DX testing would give us an assessment of any potential benefit of adjuvant chemotherapy here in addition to the endocrine therapy mentioned above.    (4) she had invasive ductal carcinoma, 11 mm tumor, negative sentinel node biopsy, ERP positive, PRP positive, HER2 Morro negative.  Stage I disease.  Oncotype DX testing supports 3% recurrence with adjuvant tamoxifen or Arimidex.  She is S/P  radiation adjuvantly at this time.    Her surgeon of record is Dr. Lyman, her radiation physician is Dr. Kapoor, and her primary care is Chace Fraga NP.    (5) Tamoxifen 20 mg p.o. daily adjuvantly, day 1 will be February 1, 2025.    Will monitor CBC, CMP, breast cancer tumor markers and CT DNA.  She will continue on tamoxifen, she appears to be tolerating it well, rash has resolved.  She will follow-up here in 3 months.    Rogelio Saldaña MD  [facility]  3/11/25

## 2025-06-23 ENCOUNTER — TELEPHONE (OUTPATIENT)
Facility: CLINIC | Age: 60
End: 2025-06-23
Payer: COMMERCIAL

## 2025-06-23 NOTE — TELEPHONE ENCOUNTER
Notified by Emma that tubes for ctDNA were not labeled properly. Discussed with patient and patient say she is able to stop by 6/25/25 for a redraw after her appointment with Dr Kapoor.

## 2025-06-25 ENCOUNTER — OFFICE VISIT (OUTPATIENT)
Dept: RADIATION ONCOLOGY | Facility: CLINIC | Age: 60
End: 2025-06-25
Payer: COMMERCIAL

## 2025-06-25 VITALS
BODY MASS INDEX: 29.08 KG/M2 | SYSTOLIC BLOOD PRESSURE: 132 MMHG | HEART RATE: 101 BPM | DIASTOLIC BLOOD PRESSURE: 89 MMHG | WEIGHT: 169.38 LBS | TEMPERATURE: 98 F

## 2025-06-25 DIAGNOSIS — C50.911 INVASIVE DUCTAL CARCINOMA OF BREAST, FEMALE, RIGHT: Primary | ICD-10-CM

## 2025-06-25 PROCEDURE — 4010F ACE/ARB THERAPY RXD/TAKEN: CPT | Mod: CPTII,S$GLB,, | Performed by: RADIOLOGY

## 2025-06-25 PROCEDURE — 99214 OFFICE O/P EST MOD 30 MIN: CPT | Mod: S$GLB,,, | Performed by: RADIOLOGY

## 2025-06-25 PROCEDURE — 3079F DIAST BP 80-89 MM HG: CPT | Mod: CPTII,S$GLB,, | Performed by: RADIOLOGY

## 2025-06-25 PROCEDURE — 3008F BODY MASS INDEX DOCD: CPT | Mod: CPTII,S$GLB,, | Performed by: RADIOLOGY

## 2025-06-25 PROCEDURE — 3075F SYST BP GE 130 - 139MM HG: CPT | Mod: CPTII,S$GLB,, | Performed by: RADIOLOGY

## 2025-06-25 NOTE — PROGRESS NOTES
Claire Guardado  [id number]  1965 6/25/2025    DIAGNOSIS: Stage IA, pT1cN0(sn)M0 g1 IDC UOQ R breast, ER+/IA+/HER2(-)     REASON FOR VISIT: Routine scheduled follow-up.    HISTORY OF PRESENT ILLNESS:   Claire Guardado is a 59 y.o. postmenopausal female with suspected Sjogren's syndrome (not on therapy) and (-) family history of breast cancer with abnormal screening mammogram in January of this year appreciating multiple nodules with follow-up ultrasound demonstrating simple and complicated cysts.  This was followed by short interval diagnostic mammogram and ultrasound which revealed a 5 x 7 mm irregular hypoechoic nodule at 10:00 5CFN of the right breast.  Core needle biopsy returned grade 1 (5/9) invasive ductal carcinoma with associated grade 2 DCIS.  There was no lymphovascular space invasion and tumor was ER + %, IA + 80-90%, HER2(-).      Patient was seen in Multidisciplinary Comprehensive [facility] Clinic at [facility] by Dr. Escobar (Medical Oncology), and myself (Radiation Oncology) to formulate treatment plan.  She had a surgical consultation with Dr. Lyman and was tentatively planned for lumpectomy and sentinel lymph node biopsy 9/25/24.     Proceeded to lumpectomy and sentinel lymph node assessment with Dr. Lyman, 09/25/2024:               - 1.1 cm grade 1 (5/9) invasive ductal carcinoma without lymphovascular space invasion              - negative margin              - 0/2 SLN     Oncotype DX: pending  Recurrence score *  *% 9 year risk of distant recurrence with AHUMADA/AI alone   * chemotherapy benefit                She then completed adj R-WBRT on 11/27/25, and now continues on adj AHUMADA.    INTERVAL HISTORY:   The patient tolerated and has recovered from BCT very well without notable complaints or acute changes. Next dx MMG set for Aug 2025 at [place].    Review of systems otherwise negative unless indicated in HPI/interval history.    Past Medical History:   Diagnosis Date    Breast cancer      GERD (gastroesophageal reflux disease)     Hypertension     Rash due to allergy      Past Surgical History:   Procedure Laterality Date    HYSTERECTOMY      MASTECTOMY, PARTIAL Right 09/2024    SENTINEL LYMPH NODE BIOPSY Right 09/25/2024    X 2     Social History     Socioeconomic History    Marital status:    Tobacco Use    Smoking status: Never   Substance and Sexual Activity    Alcohol use: Never    Drug use: Never    Sexual activity: Yes     Partners: Male     Social Drivers of Health     Financial Resource Strain: Patient Declined (3/5/2025)    Overall Financial Resource Strain (CARDIA)     Difficulty of Paying Living Expenses: Patient declined   Food Insecurity: Patient Declined (3/5/2025)    Hunger Vital Sign     Worried About Running Out of Food in the Last Year: Patient declined     Ran Out of Food in the Last Year: Patient declined   Transportation Needs: Patient Declined (3/5/2025)    PRAPARE - Transportation     Lack of Transportation (Medical): Patient declined     Lack of Transportation (Non-Medical): Patient declined   Physical Activity: Sufficiently Active (3/5/2025)    Exercise Vital Sign     Days of Exercise per Week: 7 days     Minutes of Exercise per Session: 30 min   Stress: Patient Declined (3/5/2025)    [employer] - Occupational Stress Questionnaire     Feeling of Stress : Patient declined   Housing Stability: Patient Declined (3/5/2025)    Housing Stability Vital Sign     Unable to Pay for Housing in the Last Year: Patient declined     Homeless in the Last Year: Patient declined     Family History   Problem Relation Name Age of Onset    Hypertension Mother      Stroke Mother      Arthritis Mother      Kidney cancer Sister      Hypertension Brother       Medication List with Changes/Refills   Current Medications    CETIRIZINE (ZYRTEC) 10 MG TABLET    Take 10 mg by mouth 2 (two) times a day.    DOXYCYCLINE (VIBRA-TABS) 100 MG TABLET    Take 1 tablet (100  mg total) by mouth 2 (two) times daily.    FAMOTIDINE (PEPCID) 20 MG TABLET    Take 20 mg by mouth 2 (two) times daily.    HYDROXYZINE HCL (ATARAX) 25 MG TABLET    TAKE ONE TABLET BY MOUTH NIGHTLY    LANSOPRAZOLE (PREVACID) 30 MG CAPSULE    Take 30 mg by mouth once daily.    PRENATAL 21-IRON FU-FOLIC ACID ORAL    Take by mouth.    TAMOXIFEN (NOLVADEX) 20 MG TAB    Take 1 tablet (20 mg total) by mouth once daily.    TRIAMCINOLONE ACETONIDE 0.1% (KENALOG) 0.1 % CREAM    Apply topically 2 (two) times daily.    VALSARTAN (DIOVAN) 160 MG TABLET    Take 160 mg by mouth every evening.     Review of patient's allergies indicates:   Allergen Reactions    Augmentin [amoxicillin-pot clavulanate]     Clindamycin        QUALITY OF LIFE: 90%- Able to Carry on Normal Activity: Minor Symptoms of Disease    Vitals:    06/25/25 1254   BP: 132/89   Pulse: 101   Temp: 98 °F (36.7 °C)   Weight: 76.8 kg (169 lb 6.4 oz)   PainSc:   3   PainLoc: Breast     Body mass index is 29.08 kg/m².    PHYSICAL EXAM:  GENERAL: alert; in no apparent distress.   HEAD: normocephalic, atraumatic.  EYES: pupils are equal, round, reactive to light and accommodation. Sclera anicteric. Conjunctiva not injected.   NOSE/THROAT: no nasal erythema or rhinorrhea. Oropharynx pink, without erythema, ulcerations or thrush.   NECK: no cervical motion rigidity; supple with no masses.  CHEST: Patient is speaking comfortably on room air with normal work of breathing without using accessory muscles of respiration.  CARDIOVASCULAR: regular rate and rhythm  ABDOMEN: soft, nontender, nondistended.   MUSCULOSKELETAL: no tenderness to palpation along the spine or scapulae. Normal range of motion.  NEUROLOGIC: cranial nerves II-XII intact bilaterally. Strength 5/5 in bilateral upper and lower extremities. No sensory deficits appreciated. Normal gait.  LYMPHATIC: no cervical, supraclavicular or axillary adenopathy appreciated.   EXTREMITIES: no clubbing, cyanosis, edema.  SKIN:  no erythema, rashes, ulcerations noted.       ASSESSMENT: Claire Guardado is a female with stage IA, pT1cN0(sn)M0 g1 IDC UOQ R breast, ER+/IL+/HER2(-)     PLAN:   The patient tolerated and has recovered from her adj RT very well.  She continues adjuvant endocrine therapy via AI w/o issues/compalints.      She verbalized understanding to continue skin care moisturizing, and he has upcoming follow-up within the next 1-3 months with medical oncology.      It was explained to her that he will require at least biannual clinical breast exams as well as annual diagnostic mammograms going forward.      Will refer to [facility] survivorship clinic for instructions and coordination or surveillance and risk-lowering needs.    All questions answered and contact information provided. Patient understands free to call us anytime with any questions or concerns regarding radiation therapy.    I have personally seen and evaluated this patient with a moderate to high complexity diagnosis.     PHYSICIAN: Alonso Beth III, MD

## 2025-06-27 ENCOUNTER — PATIENT MESSAGE (OUTPATIENT)
Dept: RADIATION ONCOLOGY | Facility: CLINIC | Age: 60
End: 2025-06-27

## 2025-07-08 ENCOUNTER — OFFICE VISIT (OUTPATIENT)
Dept: URGENT CARE | Facility: CLINIC | Age: 60
End: 2025-07-08
Payer: COMMERCIAL

## 2025-07-08 VITALS
SYSTOLIC BLOOD PRESSURE: 108 MMHG | OXYGEN SATURATION: 96 % | BODY MASS INDEX: 28.85 KG/M2 | HEART RATE: 90 BPM | DIASTOLIC BLOOD PRESSURE: 76 MMHG | WEIGHT: 169 LBS | RESPIRATION RATE: 16 BRPM | TEMPERATURE: 99 F | HEIGHT: 64 IN

## 2025-07-08 DIAGNOSIS — R09.82 POSTNASAL DRIP: ICD-10-CM

## 2025-07-08 DIAGNOSIS — J02.9 SORE THROAT: ICD-10-CM

## 2025-07-08 DIAGNOSIS — J01.90 ACUTE BACTERIAL SINUSITIS: Primary | ICD-10-CM

## 2025-07-08 DIAGNOSIS — B96.89 ACUTE BACTERIAL SINUSITIS: Primary | ICD-10-CM

## 2025-07-08 LAB
CTP QC/QA: YES
FLUAV AG NPH QL: NEGATIVE
FLUBV AG NPH QL: NEGATIVE
S PYO RRNA THROAT QL PROBE: NEGATIVE
SARS-COV+SARS-COV-2 AG RESP QL IA.RAPID: NEGATIVE

## 2025-07-08 PROCEDURE — 87811 SARS-COV-2 COVID19 W/OPTIC: CPT | Mod: QW,S$GLB,,

## 2025-07-08 PROCEDURE — 87804 INFLUENZA ASSAY W/OPTIC: CPT | Mod: QW,,,

## 2025-07-08 PROCEDURE — 99204 OFFICE O/P NEW MOD 45 MIN: CPT | Mod: S$GLB,,,

## 2025-07-08 PROCEDURE — 87880 STREP A ASSAY W/OPTIC: CPT | Mod: QW,,,

## 2025-07-08 RX ORDER — AZELASTINE 1 MG/ML
1-2 SPRAY, METERED NASAL 2 TIMES DAILY PRN
Qty: 30 ML | Refills: 0 | Status: SHIPPED | OUTPATIENT
Start: 2025-07-08 | End: 2026-07-08

## 2025-07-08 RX ORDER — AMOXICILLIN 875 MG/1
875 TABLET, COATED ORAL EVERY 12 HOURS
Qty: 10 TABLET | Refills: 0 | Status: SHIPPED | OUTPATIENT
Start: 2025-07-08 | End: 2025-07-13

## 2025-07-08 RX ORDER — PREDNISONE 20 MG/1
40 TABLET ORAL DAILY
Qty: 6 TABLET | Refills: 0 | Status: SHIPPED | OUTPATIENT
Start: 2025-07-08 | End: 2025-07-11

## 2025-07-08 RX ORDER — FLUTICASONE PROPIONATE 50 MCG
2 SPRAY, SUSPENSION (ML) NASAL DAILY
Qty: 15.8 ML | Refills: 0 | Status: SHIPPED | OUTPATIENT
Start: 2025-07-08

## 2025-07-08 NOTE — PROGRESS NOTES
"Subjective:      Patient ID: Claire Guardado is a 60 y.o. female.    Vitals:  height is 5' 4" (1.626 m) and weight is 76.7 kg (169 lb). Her temperature is 98.5 °F (36.9 °C). Her blood pressure is 108/76 and her pulse is 90. Her respiration is 16 and oxygen saturation is 96%.     Chief Complaint: No chief complaint on file.    60-year-old female patient presents to the clinic with complaints of a headache, fatigue, body aches, sinus pain/pressure, sinus congestion, bilateral ear pain, sore throat, and itchy eyes for the past 2 days.  Patient denies any known sick exposure.  Patient denies any fever, chest pain, shortness of breath, nausea, vomiting, diarrhea, or abdominal pain.  Patient reports taking over-the-counter Tylenol and ibuprofen for pain relief.    Sore Throat   This is a new problem. The current episode started in the past 7 days (x 4 days). The problem has been gradually worsening. Associated symptoms include congestion, ear pain and headaches. Pertinent negatives include no abdominal pain, coughing, diarrhea, neck pain, shortness of breath or vomiting. Associated symptoms comments: rash.       Constitution: Positive for fatigue. Negative for chills, sweating and fever.   HENT:  Positive for ear pain, congestion, postnasal drip, sinus pain, sinus pressure and sore throat.    Neck: Negative for neck pain.   Cardiovascular:  Negative for chest pain and palpitations.   Respiratory:  Negative for cough and shortness of breath.    Gastrointestinal:  Negative for abdominal pain, nausea, vomiting and diarrhea.   Musculoskeletal:  Negative for muscle ache.   Skin:  Negative for color change, pale, rash and erythema.   Neurological:  Positive for headaches. Negative for dizziness, disorientation, altered mental status, numbness and tingling.   Psychiatric/Behavioral:  Negative for altered mental status, disorientation and confusion.       Objective:     Physical Exam   Constitutional: She is oriented to " person, place, and time. She appears well-developed. She is cooperative.  Non-toxic appearance. She does not appear ill. No distress. obesity  HENT:   Head: Normocephalic and atraumatic.   Ears:   Right Ear: Hearing, tympanic membrane, external ear and ear canal normal. no impacted cerumen  Left Ear: Hearing, tympanic membrane, external ear and ear canal normal. no impacted cerumen  Nose: Congestion present. No mucosal edema, rhinorrhea or nasal deformity. No epistaxis. Right sinus exhibits maxillary sinus tenderness and frontal sinus tenderness. Left sinus exhibits maxillary sinus tenderness and frontal sinus tenderness.   Mouth/Throat: Uvula is midline and mucous membranes are normal. Mucous membranes are moist. No trismus in the jaw. Normal dentition. No uvula swelling. Posterior oropharyngeal erythema present. No oropharyngeal exudate or posterior oropharyngeal edema. Oropharynx is clear.   Eyes: Conjunctivae and lids are normal. Pupils are equal, round, and reactive to light. Right eye exhibits no discharge. Left eye exhibits no discharge. No scleral icterus. Extraocular movement intact   Neck: Trachea normal and phonation normal. Neck supple. No edema present. No erythema present. No neck rigidity present.   Cardiovascular: Normal rate, regular rhythm, normal heart sounds and normal pulses.   No murmur heard.  Pulmonary/Chest: Effort normal and breath sounds normal. No respiratory distress. She has no decreased breath sounds. She has no wheezes. She has no rhonchi. She has no rales.   Abdominal: Normal appearance and bowel sounds are normal. She exhibits no distension.   Musculoskeletal: Normal range of motion.         General: No deformity. Normal range of motion.   Neurological: She is alert and oriented to person, place, and time. No sensory deficit. She exhibits normal muscle tone. Coordination normal.   Skin: Skin is warm, dry, intact, not diaphoretic, not pale and no rash. No erythema   Psychiatric: Her  speech is normal and behavior is normal. Judgment and thought content normal.   Nursing note and vitals reviewed.      Assessment:     1. Acute bacterial sinusitis    2. Sore throat    3. Postnasal drip        Plan:       Acute bacterial sinusitis    Sore throat  -     POCT Influenza A/B Rapid Antigen  -     SARS Coronavirus 2 Antigen, POCT Manual Read  -     POCT rapid strep A    Postnasal drip    Other orders  -     predniSONE (DELTASONE) 20 MG tablet; Take 2 tablets (40 mg total) by mouth once daily. for 3 days  Dispense: 6 tablet; Refill: 0  -     azelastine (ASTELIN) 137 mcg (0.1 %) nasal spray; 1-2 sprays (137-274 mcg total) by Nasal route 2 (two) times daily as needed for Rhinitis (Postnasal drip).  Dispense: 30 mL; Refill: 0  -     fluticasone propionate (FLONASE) 50 mcg/actuation nasal spray; 2 sprays (100 mcg total) by Each Nostril route once daily.  Dispense: 15.8 mL; Refill: 0  -     amoxicillin (AMOXIL) 875 MG tablet; Take 1 tablet (875 mg total) by mouth every 12 (twelve) hours. for 5 days  Dispense: 10 tablet; Refill: 0                Labs:  Influenza A and B negative.  COVID negative.  Rapid strep negative.  Take medications as prescribed.  Tylenol/Motrin per package instructions for any pain or fever.  Assure adequate hydration and rest.  Throat lozenges or Chloraseptic per package instructions for sore throat.    Warm salt water gargles every 2-3 hours as needed for sore throat.    Nasal saline flushes or irrigation as directed for nasal saline congestion and sinus related symptoms.  Follow-up with PCP in 1-2 days.  Follow up with ENT if symptoms fail to improve in the next 10-14 days.  Return to clinic as needed.  To ED for any new or acutely worsening symptoms.  Patient in agreement with plan of care.    DISCLAIMER: Please note that my documentation in this Electronic Healthcare Record was produced using speech recognition software and therefore may contain errors related to that software  system.These could include grammar, punctuation and spelling errors or the inclusion/exclusion of phrases that were not intended. Garbled syntax, mangled pronouns, and other bizarre constructions may be attributed to that software system.

## 2025-07-09 ENCOUNTER — OFFICE VISIT (OUTPATIENT)
Dept: DERMATOLOGY | Facility: CLINIC | Age: 60
End: 2025-07-09
Payer: COMMERCIAL

## 2025-07-09 DIAGNOSIS — D23.0 OTHER BENIGN NEOPLASM OF SKIN OF LIP: ICD-10-CM

## 2025-07-09 DIAGNOSIS — D23.9 DERMATOFIBROMA: ICD-10-CM

## 2025-07-09 DIAGNOSIS — L30.8 OTHER ECZEMA: Primary | ICD-10-CM

## 2025-07-09 PROCEDURE — 99214 OFFICE O/P EST MOD 30 MIN: CPT | Mod: S$GLB,,, | Performed by: STUDENT IN AN ORGANIZED HEALTH CARE EDUCATION/TRAINING PROGRAM

## 2025-07-09 PROCEDURE — 4010F ACE/ARB THERAPY RXD/TAKEN: CPT | Mod: CPTII,S$GLB,, | Performed by: STUDENT IN AN ORGANIZED HEALTH CARE EDUCATION/TRAINING PROGRAM

## 2025-07-09 PROCEDURE — 1159F MED LIST DOCD IN RCRD: CPT | Mod: CPTII,S$GLB,, | Performed by: STUDENT IN AN ORGANIZED HEALTH CARE EDUCATION/TRAINING PROGRAM

## 2025-07-09 PROCEDURE — 1160F RVW MEDS BY RX/DR IN RCRD: CPT | Mod: CPTII,S$GLB,, | Performed by: STUDENT IN AN ORGANIZED HEALTH CARE EDUCATION/TRAINING PROGRAM

## 2025-07-09 RX ORDER — CLOBETASOL PROPIONATE 0.5 MG/G
CREAM TOPICAL 2 TIMES DAILY
Qty: 60 G | Refills: 1 | Status: SHIPPED | OUTPATIENT
Start: 2025-07-09

## 2025-07-09 NOTE — PROGRESS NOTES
Subjective:      Patient ID:  Claire Guardado is a 60 y.o. female who presents for   Chief Complaint   Patient presents with    Rash     Follow up     LOV 11/21/2024    Patient is coming in today for a follow up on her rash. States that the rash was clear. Then on Feb 1st  gave her tamoxifen. On May 23rd the rash came back and they discontinued the tamoxifen. Dr. Rivera told her to take 20mg zyrtec and 20 mg of Pepcid and states that seemed to help. Then on July 4th she got a sinus infection and the rash in now on her abdomen. States sometimes it itches and some times it burns. Patient has negative allergy testing.     Patient also has small rash on palms    Patch test negative         Current Outpatient Medications:   ·  amoxicillin (AMOXIL) 875 MG tablet, Take 1 tablet (875 mg total) by mouth every 12 (twelve) hours. for 5 days, Disp: 10 tablet, Rfl: 0  ·  azelastine (ASTELIN) 137 mcg (0.1 %) nasal spray, 1-2 sprays (137-274 mcg total) by Nasal route 2 (two) times daily as needed for Rhinitis (Postnasal drip)., Disp: 30 mL, Rfl: 0  ·  cetirizine (ZYRTEC) 10 MG tablet, Take 10 mg by mouth 2 (two) times a day., Disp: , Rfl:   ·  famotidine (PEPCID) 20 MG tablet, Take 20 mg by mouth 2 (two) times daily., Disp: , Rfl:   ·  fluticasone propionate (FLONASE) 50 mcg/actuation nasal spray, 2 sprays (100 mcg total) by Each Nostril route once daily., Disp: 15.8 mL, Rfl: 0  ·  lansoprazole (PREVACID) 30 MG capsule, Take 30 mg by mouth once daily., Disp: , Rfl:   ·  predniSONE (DELTASONE) 20 MG tablet, Take 2 tablets (40 mg total) by mouth once daily. for 3 days, Disp: 6 tablet, Rfl: 0  ·  PRENATAL 21-IRON FU-FOLIC ACID ORAL, Take by mouth., Disp: , Rfl:   ·  valsartan (DIOVAN) 160 MG tablet, Take 160 mg by mouth every evening., Disp: , Rfl:   ·  doxycycline (VIBRA-TABS) 100 MG tablet, Take 1 tablet (100 mg total) by mouth 2 (two) times daily., Disp: 14 tablet, Rfl: 0  ·  hydrOXYzine HCL (ATARAX) 25 MG tablet,  TAKE ONE TABLET BY MOUTH NIGHTLY, Disp: 30 tablet, Rfl: 2  ·  tamoxifen (NOLVADEX) 20 MG Tab, Take 1 tablet (20 mg total) by mouth once daily. (Patient not taking: Reported on 7/8/2025), Disp: 30 tablet, Rfl: 11  ·  triamcinolone acetonide 0.1% (KENALOG) 0.1 % cream, Apply topically 2 (two) times daily., Disp: 454 g, Rfl: 1    Hx:  Initially seen in October 2024 for rash.   Invasive ductal carcinoma - previously had radiation, was getting radiation when rash started.   On 9/11 she developed a rash on her abdomen. Lesion is fixed and and spreading states that it is itchy, stings, and is dry.  9/16/24- she saw Dr. Escobar and Dr. Norton.   She was started claritin.   9/23- saw her rheumatology in [facility] - has hx of sjogren's   9/24- Dr. Chace Fraga- referred here   9/25- had lumpectomy   9/30- Dr. Amezcua - GP-  has prednisone 15mg x 1, 10mg x 1, 5mg x 1- given by rheumatology for achiness- only uses 4 times a year- so Seven started 15mg x 2 days, 10 mg x 1 day, 5mg x 5 days- 9/30- 10/6 - did not help.   She then started triamcinolone cream, cerave cream, cerave soap, caladryl.   No new medications- on her current regimen for years  Denies taking any supplements.   Per Dr. Rivera note she has possible allergy to nickel however patch testing was negative  On 11/11 saw allergist Dr. Rivera - switched some medications around. Now only on zyrtec 10mg BID (5mg previously- this was started bc of the rash), hydroxyzine nightly, was on esomeprazole for years - changed to prevacid, and was on amlodipine and lisinopril for years but switched to valsartan. They discontinued the asa.   Tried switching deodorant and detergent  Removed metal piece for breast cancer marker due to concern for systemic contact derm          Final Pathologic Diagnosis       1. Skin, left back, punch biopsy:   - MILD EPIDERMAL SPONGIOSIS WITH MIXED SUPERFICIAL TO MID DERMAL INFLAMMATORY INFILTRATE (see comment)     Comment:  These histologic  findings would be compatible with both drug eruption and contact reaction.  Clinical correlation is recommended.  No cancer cells noted on histologic examination. Your provider will correlate this pathology report with your clinical findings.             Review of Systems   Constitutional:  Negative for fever, chills and fatigue.   Respiratory:  Negative for cough and shortness of breath.    Gastrointestinal:  Negative for nausea, vomiting and diarrhea.   Skin:  Positive for itching, rash and dry skin.       Objective:   Physical Exam   Constitutional: She appears well-developed and well-nourished.   Neurological: She is alert and oriented to person, place, and time.   Psychiatric: She has a normal mood and affect.   Skin:   Areas Examined (abnormalities noted in diagram):   Head / Face Inspection Performed  Abdomen Inspection Performed  RLE Inspected                 Diagram Legend     Erythematous scaling macule/papule c/w actinic keratosis       Vascular papule c/w angioma      Pigmented verrucoid papule/plaque c/w seborrheic keratosis      Yellow umbilicated papule c/w sebaceous hyperplasia      Irregularly shaped tan macule c/w lentigo     1-2 mm smooth white papules consistent with Milia      Movable subcutaneous cyst with punctum c/w epidermal inclusion cyst      Subcutaneous movable cyst c/w pilar cyst      Firm pink to brown papule c/w dermatofibroma      Pedunculated fleshy papule(s) c/w skin tag(s)      Evenly pigmented macule c/w junctional nevus     Mildly variegated pigmented, slightly irregular-bordered macule c/w mildly atypical nevus      Flesh colored to evenly pigmented papule c/w intradermal nevus       Pink pearly papule/plaque c/w basal cell carcinoma      Erythematous hyperkeratotic cursted plaque c/w SCC      Surgical scar with no sign of skin cancer recurrence      Open and closed comedones      Inflammatory papules and pustules      Verrucoid papule consistent consistent with wart      Erythematous eczematous patches and plaques     Dystrophic onycholytic nail with subungual debris c/w onychomycosis     Umbilicated papule    Erythematous-base heme-crusted tan verrucoid plaque consistent with inflamed seborrheic keratosis     Erythematous Silvery Scaling Plaque c/w Psoriasis     See annotation      Assessment / Plan:        Other eczema  -     clobetasoL (TEMOVATE) 0.05 % cream; Apply topically 2 (two) times daily.  Dispense: 60 g; Refill: 1  Biopsy proven spong derm- initially thought to be medication related however she is now had all of her medication changed and she continues to have intermittent flare ups. Also had metal clip removed as there was concern for systemic contact dermatitis- still with flare ups, patch test negative  Unknown contact vs. Intrinsic eczema?  Discussed continued intermittent use of topicals such as clobetasol BID x 2 weeks when flaring- not on face, groin, continue vanicream, continue zyrtec  Also discussed systemics including MTX vs. Dupixent- she declines today- recommend she has onc in case we need these in the future  - patient counseled to use topical steroids for specified period of time and on locations discussed to prevent side effects. Reviewed side effects of long-term use of topical steroids which include thinning and lightening of skin    Dermatofibroma  This is a benign scar-like lesion secondary to minor trauma. No treatment required.     Other benign neoplasm of skin of lip  Lower lip  Bite fibroma vs. Mucocele   Offered ENT referral for removal but she declines  Rtc if rapidly growing or bleeding           No follow-ups on file.

## 2025-07-21 DIAGNOSIS — C50.911 INVASIVE DUCTAL CARCINOMA OF BREAST, FEMALE, RIGHT: Primary | ICD-10-CM

## 2025-07-28 ENCOUNTER — TELEPHONE (OUTPATIENT)
Facility: CLINIC | Age: 60
End: 2025-07-28
Payer: COMMERCIAL

## 2025-07-28 DIAGNOSIS — C50.911 INVASIVE DUCTAL CARCINOMA OF BREAST, FEMALE, RIGHT: Primary | ICD-10-CM

## 2025-07-29 ENCOUNTER — TELEPHONE (OUTPATIENT)
Facility: CLINIC | Age: 60
End: 2025-07-29
Payer: COMMERCIAL

## 2025-08-06 ENCOUNTER — OFFICE VISIT (OUTPATIENT)
Facility: CLINIC | Age: 60
End: 2025-08-06
Payer: COMMERCIAL

## 2025-08-06 VITALS
BODY MASS INDEX: 28.91 KG/M2 | HEART RATE: 84 BPM | HEIGHT: 64 IN | OXYGEN SATURATION: 96 % | TEMPERATURE: 97 F | RESPIRATION RATE: 18 BRPM | SYSTOLIC BLOOD PRESSURE: 135 MMHG | DIASTOLIC BLOOD PRESSURE: 88 MMHG | WEIGHT: 169.31 LBS

## 2025-08-06 DIAGNOSIS — C50.911 INVASIVE DUCTAL CARCINOMA OF BREAST, FEMALE, RIGHT: Primary | ICD-10-CM

## 2025-08-06 PROCEDURE — 99205 OFFICE O/P NEW HI 60 MIN: CPT | Mod: S$GLB,,,

## 2025-08-06 PROCEDURE — G2211 COMPLEX E/M VISIT ADD ON: HCPCS | Mod: S$GLB,,,

## 2025-08-06 PROCEDURE — 3079F DIAST BP 80-89 MM HG: CPT | Mod: CPTII,S$GLB,,

## 2025-08-06 PROCEDURE — 4010F ACE/ARB THERAPY RXD/TAKEN: CPT | Mod: CPTII,S$GLB,,

## 2025-08-06 PROCEDURE — 99999 PR PBB SHADOW E&M-EST. PATIENT-LVL IV: CPT | Mod: PBBFAC,,,

## 2025-08-06 PROCEDURE — 1159F MED LIST DOCD IN RCRD: CPT | Mod: CPTII,S$GLB,,

## 2025-08-06 PROCEDURE — 3008F BODY MASS INDEX DOCD: CPT | Mod: CPTII,S$GLB,,

## 2025-08-06 PROCEDURE — 3075F SYST BP GE 130 - 139MM HG: CPT | Mod: CPTII,S$GLB,,

## 2025-08-06 NOTE — PROGRESS NOTES
[facility] HEMATOLGY ONCOLOGY CONSULTATION    Subjective:       Patient ID: Claire Guardado is a 60 y.o. female  Chief Complaint: Invasive ductal carcinoma of breast, female, right (6 WK F/U AFTER CTDNA/)        HPI  Patient returns today for a scheduled follow-up visit.  The patient is here today to go over the results of the recently ordered labs, tests and studies.     She previously saw Dr Hodges on 6/11/2025 for the monitoring of her breast cancer surveillance.      She started Tamoxifen in February 2025 but discontinued in May of 2025 after developing a rash and body aches with fatigue. She does continue with joint pains but the fatigue is improving.     She is seeing a Doctor Adelfo of Rheumatology in [place] for positive SHASTA.  He does believe she has Sjogren syndrome. She is not receiving treatment for this.    She is in surveillance with CT- DNA, CA 27-29, and CA 15-3 every 3 months. CT-DNA is negative as of 6/25/2025. She is also receiving clinical breast exams every 6 months.    Mammogram scheduled for next week    She denies any new breast symptoms    She had a colonoscopy with dr minaya in 202023  which was negative and is due to repeat in 10 years.     Cancer Treatment Summary:    January 2024 abnormal screening mammogram, with a 7 mm nodule area at the 10 o'clock position of the Right breast.   confirmed in August , 2024.  Biopsy returned invasive ductal carcinoma, grade 1, ERP was positive, PRP was positive, HER2 Morro was 1+ negative.  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  The primary tumor per the biopsy was at least 7 mm.     Partial mastectomy with lymph node biopsy showed a 11 mm invasive ductal carcinoma, grade 1, she had no positive sentinel node biopsy.  This was a pT1C pN0 M0 ERP positive PRP positive HER2   Morro negative , stage I disease.    Oncotype DX supports 3% recurrence with adjuvant tamoxifen or Arimidex, adjuvant chemotherapy is not indicated here.   She has completed radiation therapy per Dr. Kapoor, skin at the breast is intact.     She is 60.  Status post partial hysterectomy.  She complains of easy fatigue.  She has history of Sjogren syndrome with hip and back pain symptoms.  We have discussed the option of tamoxifen and Arimidex.  I have reviewed with her potential side effects of adjuvant endocrine therapy including hot flash symptoms, and joint pain symptoms while on these medications.  There can be slight increased risk of uterine cancer with tamoxifen usage, she has had partial hysterectomy.  We have decided to go with the tamoxifen 20 mg p.o. daily adjuvantly.  She is concerned that Arimidex may aggravate her Sjogren syndrome and joint and bone pain symptoms.    She does not smoke, she does not drink alcohol, and she is a registered nurse retired.     She is status post partial hysterectomy in .  She had onset of menses at age 12, she delivered her 1st and only child at the maternal age of 30.  She is a  1 para 1 miscarriage 0 individual.  She has not had history of breast biopsy prior to the current situation.  Her mother, sister, daughter has not had breast cancer.     Her mother has Sjogren's syndrome, osteoarthritis, hypertension, and CVA x2.  Her father had Alzheimer's disease, prostate cancer, he was a smoker.  Her brother is a smoker and has COPD, another brother is alive and well, a sister has history of kidney cancer and her daughter has history of thyroid disease, a paternal grandmother had uterine cancer.             Past Medical History:   Diagnosis Date    Breast cancer     GERD (gastroesophageal reflux disease)     Hypertension     Rash due to allergy        Past Surgical History:   Procedure Laterality Date    HYSTERECTOMY      MASTECTOMY, PARTIAL Right 2024    SENTINEL LYMPH NODE BIOPSY Right 09/25/2024    X 2       Social History     Socioeconomic History    Marital status:    Tobacco Use    Smoking status:  Never   Substance and Sexual Activity    Alcohol use: Never    Drug use: Never    Sexual activity: Yes     Partners: Male     Social Drivers of Health     Financial Resource Strain: Patient Declined (3/5/2025)    Overall Financial Resource Strain (CARDIA)     Difficulty of Paying Living Expenses: Patient declined   Food Insecurity: Patient Declined (3/5/2025)    Hunger Vital Sign     Worried About Running Out of Food in the Last Year: Patient declined     Ran Out of Food in the Last Year: Patient declined   Transportation Needs: Patient Declined (3/5/2025)    PRAPARE - Transportation     Lack of Transportation (Medical): Patient declined     Lack of Transportation (Non-Medical): Patient declined   Physical Activity: Sufficiently Active (3/5/2025)    Exercise Vital Sign     Days of Exercise per Week: 7 days     Minutes of Exercise per Session: 30 min   Stress: Patient Declined (3/5/2025)    [employer] - Occupational Stress Questionnaire     Feeling of Stress : Patient declined   Housing Stability: Patient Declined (3/5/2025)    Housing Stability Vital Sign     Unable to Pay for Housing in the Last Year: Patient declined     Homeless in the Last Year: Patient declined       Family History   Problem Relation Name Age of Onset    Hypertension Mother      Stroke Mother      Arthritis Mother      Kidney cancer Sister      Hypertension Brother         Review of patient's allergies indicates:   Allergen Reactions    Augmentin [amoxicillin-pot clavulanate]     Clindamycin      Current Medications[1]    All medications and past history have been reviewed.    Review of Systems   Constitutional:  Negative for appetite change and unexpected weight change.   HENT:  Negative for mouth sores.    Eyes:  Negative for visual disturbance.   Respiratory:  Positive for shortness of breath. Negative for cough.    Cardiovascular:  Negative for chest pain.   Gastrointestinal:  Negative for abdominal pain and  "diarrhea.   Genitourinary:  Negative for frequency.   Musculoskeletal:  Positive for back pain.   Skin:  Negative for rash.   Neurological:  Negative for headaches.   Hematological:  Negative for adenopathy.   Psychiatric/Behavioral:  The patient is not nervous/anxious.        Objective:        /88 (BP Location: Left arm, Patient Position: Sitting)   Pulse 84   Temp 97.3 °F (36.3 °C) (Temporal)   Resp 18   Ht 5' 4" (1.626 m)   Wt 76.8 kg (169 lb 5 oz)   SpO2 96%   BMI 29.06 kg/m²     Physical Exam  Constitutional:       Appearance: Normal appearance.   HENT:      Head: Normocephalic.      Nose: Nose normal.      Mouth/Throat:      Mouth: Mucous membranes are moist.   Eyes:      Pupils: Pupils are equal, round, and reactive to light.   Cardiovascular:      Rate and Rhythm: Normal rate and regular rhythm.      Pulses: Normal pulses.      Heart sounds: Normal heart sounds.   Pulmonary:      Effort: Pulmonary effort is normal. No respiratory distress.      Breath sounds: Normal breath sounds. No wheezing.   Chest:   Breasts:     Right: Normal. No swelling, bleeding, inverted nipple, mass, nipple discharge, skin change or tenderness.      Left: Normal. No swelling, bleeding, inverted nipple, mass, nipple discharge, skin change or tenderness.      Comments: Negative clinical breast exam  Abdominal:      General: Abdomen is flat.      Palpations: Abdomen is soft.   Musculoskeletal:         General: Normal range of motion.      Cervical back: Normal range of motion and neck supple.   Skin:     General: Skin is warm and dry.      Capillary Refill: Capillary refill takes 2 to 3 seconds.   Neurological:      General: No focal deficit present.      Mental Status: She is alert and oriented to person, place, and time.   Psychiatric:         Mood and Affect: Mood normal.           Lab:  CMP  Sodium   Date Value Ref Range Status   01/13/2022 141 136 - 145 mmol/L Final     Potassium   Date Value Ref Range Status "   01/13/2022 3.5 3.5 - 5.1 mmol/L Final     Chloride   Date Value Ref Range Status   01/13/2022 108 95 - 110 mmol/L Final     CO2   Date Value Ref Range Status   01/13/2022 21 (L) 23 - 29 mmol/L Final     Glucose   Date Value Ref Range Status   01/13/2022 115 (H) 70 - 110 mg/dL Final     BUN   Date Value Ref Range Status   01/13/2022 12 6 - 20 mg/dL Final     Creatinine   Date Value Ref Range Status   01/13/2022 0.8 0.5 - 1.4 mg/dL Final     Calcium   Date Value Ref Range Status   01/13/2022 9.8 8.7 - 10.5 mg/dL Final     Total Protein   Date Value Ref Range Status   01/13/2022 7.6 6.0 - 8.4 g/dL Final     Albumin   Date Value Ref Range Status   01/13/2022 4.1 3.5 - 5.2 g/dL Final     Total Bilirubin   Date Value Ref Range Status   01/13/2022 0.4 0.1 - 1.0 mg/dL Final     Comment:     For infants and newborns, interpretation of results should be based  on gestational age, weight and in agreement with clinical  observations.    Premature Infant recommended reference ranges:  Up to 24 hours.............<8.0 mg/dL  Up to 48 hours............<12.0 mg/dL  3-5 days..................<15.0 mg/dL  6-29 days.................<15.0 mg/dL       Alkaline Phosphatase   Date Value Ref Range Status   01/13/2022 76 55 - 135 U/L Final     AST   Date Value Ref Range Status   01/13/2022 39 10 - 40 U/L Final     ALT   Date Value Ref Range Status   01/13/2022 86 (H) 10 - 44 U/L Final     Anion Gap   Date Value Ref Range Status   01/13/2022 12 8 - 16 mmol/L Final     eGFR if    Date Value Ref Range Status   01/13/2022 >60 >60 mL/min/1.73 m^2 Final     eGFR if non    Date Value Ref Range Status   01/13/2022 >60 >60 mL/min/1.73 m^2 Final     Comment:     Calculation used to obtain the estimated glomerular filtration  rate (eGFR) is the CKD-EPI equation.        Lab Results   Component Value Date    WBC 7.11 01/13/2022    HGB 15.3 01/13/2022    HCT 44.8 01/13/2022    MCV 89 01/13/2022     01/13/2022            Specimen (24h ago, onward)      None              Radiology/Diagnostic Studies:  X-Ray Chest AP Portable  Narrative: EXAMINATION:  XR CHEST AP PORTABLE    CLINICAL HISTORY:  Essential (primary) hypertension    TECHNIQUE:  Single frontal view of the chest was performed.    COMPARISON:  None    FINDINGS:  The lungs are clear, with normal appearance of pulmonary vasculature and no pleural effusion or pneumothorax.    The cardiac silhouette is normal in size. The hilar and mediastinal contours are unremarkable.    Bones are intact.  Impression: No acute abnormality.    Electronically signed by: Carola Brumfield MD  Date:    01/13/2022  Time:    16:03  CT Head Without Contrast  Narrative: EXAMINATION:  CT HEAD WITHOUT CONTRAST    CLINICAL HISTORY:  Headache, new or worsening (Age >= 50y);    TECHNIQUE:  Routine unenhanced axial images were obtained through the head.  Sagittal and coronal reformatted images were created.  The study is reviewed in bone and soft tissue windows.    COMPARISON:  None    FINDINGS:  Intracranial contents: There is no acute intracranial abnormality.  Brain volume, ventricular size and position are normal.  There is no hemorrhage or mass/mass effect.  There is no acute edema or ischemia.  The gray-white interface is preserved without obvious acute infarction.  There are no definite regions of abnormal attenuation in the brain.  The vessels are all slightly dense which can be seen in the setting of dehydration, hemoconcentration.  There is no abnormal extra-axial fluid collection.  The basilar cisterns are open.  The cerebellar tonsils are in normal position at the level of the foramen magnum.    Extracranial contents, calvarium, soft tissues: The calvarium is normal.  There is mucosal thickening with fluid in the left maxillary sinus.  There is trace mucosal thickening in the posterior ethmoid air cells.  Otherwise, the included paranasal sinuses and mastoid air cells are  clear.  Impression: 1.  There is no acute intracranial abnormality.  There is no hemorrhage, mass/mass effect, acute edema or ischemia.    2.  Left maxillary sinusitis.    Electronically signed by: Pj Abdullahi MD  Date:    01/13/2022  Time:    14:35          All lab results and imaging results have been reviewed and discussed with the patient.   Assessment/Plan:     1. Invasive ductal carcinoma of breast, female, right  Assessment & Plan:  CT DNA is negative, repeat in 3 months due again 9/ 25  Ca 27-29 and CA 15-3 stable  Clinic breast exam negative today   Will repeat breast exam every 6 months with yearly screening mammos  Mammo scheduled for next week          Cancer Staging   Invasive ductal carcinoma of breast, female, right  Staging form: Breast, AJCC 8th Edition  - Clinical: Stage IA (cT1b, cN0, cM0, G1, ER+, ND+, HER2-) - Signed by Michael Kapoor Jr., MD on 9/16/2024  - Pathologic: Stage IA (pT1c, pN0(sn), cM0, G1, ER+, ND+, HER2-) - Signed by Michael Kapoor Jr., MD on 10/23/2024        Follow up in about 4 weeks (around 9/3/2025), or to review mammogram with Dr Escobar.     I have explained and the patient understands all of  the current recommendation(s). I have answered all of their questions to the best of my ability and to their complete satisfaction.   The patient is to continue with the current management plan.            Electronically signed by: Beverly Pelaez MSN, APRN, FNP-C         [1]   Current Outpatient Medications:     cetirizine (ZYRTEC) 10 MG tablet, Take 10 mg by mouth 2 (two) times a day., Disp: , Rfl:     lansoprazole (PREVACID) 30 MG capsule, Take 30 mg by mouth once daily., Disp: , Rfl:     PRENATAL 21-IRON FU-FOLIC ACID ORAL, Take by mouth., Disp: , Rfl:     valsartan (DIOVAN) 160 MG tablet, Take 160 mg by mouth every evening., Disp: , Rfl:     azelastine (ASTELIN) 137 mcg (0.1 %) nasal spray, 1-2 sprays (137-274 mcg total) by Nasal route 2 (two) times daily as needed for  Rhinitis (Postnasal drip)., Disp: 30 mL, Rfl: 0    clobetasoL (TEMOVATE) 0.05 % cream, Apply topically 2 (two) times daily., Disp: 60 g, Rfl: 1    doxycycline (VIBRA-TABS) 100 MG tablet, Take 1 tablet (100 mg total) by mouth 2 (two) times daily., Disp: 14 tablet, Rfl: 0    famotidine (PEPCID) 20 MG tablet, Take 20 mg by mouth 2 (two) times daily., Disp: , Rfl:     fluticasone propionate (FLONASE) 50 mcg/actuation nasal spray, 2 sprays (100 mcg total) by Each Nostril route once daily., Disp: 15.8 mL, Rfl: 0    hydrOXYzine HCL (ATARAX) 25 MG tablet, TAKE ONE TABLET BY MOUTH NIGHTLY, Disp: 30 tablet, Rfl: 2    triamcinolone acetonide 0.1% (KENALOG) 0.1 % cream, Apply topically 2 (two) times daily., Disp: 454 g, Rfl: 1

## 2025-08-06 NOTE — ASSESSMENT & PLAN NOTE
CT DNA is negative, repeat in 3 months due again 9/ 25  Ca 27-29 and CA 15-3 stable  Clinic breast exam negative today   Will repeat breast exam every 6 months with yearly screening mammos  Mammo scheduled for next week